# Patient Record
Sex: FEMALE | Race: WHITE | Employment: UNEMPLOYED | URBAN - NONMETROPOLITAN AREA
[De-identification: names, ages, dates, MRNs, and addresses within clinical notes are randomized per-mention and may not be internally consistent; named-entity substitution may affect disease eponyms.]

---

## 2017-01-27 ENCOUNTER — OFFICE VISIT - GICH (OUTPATIENT)
Dept: FAMILY MEDICINE | Facility: OTHER | Age: 22
End: 2017-01-27

## 2017-01-27 DIAGNOSIS — Z30.9 ENCOUNTER FOR CONTRACEPTIVE MANAGEMENT: ICD-10-CM

## 2017-01-27 DIAGNOSIS — H66.002 ACUTE SUPPURATIVE OTITIS MEDIA OF LEFT EAR WITHOUT SPONTANEOUS RUPTURE OF TYMPANIC MEMBRANE: ICD-10-CM

## 2017-01-27 ASSESSMENT — ANXIETY QUESTIONNAIRES
6. BECOMING EASILY ANNOYED OR IRRITABLE: NOT AT ALL
GAD7 TOTAL SCORE: 0
7. FEELING AFRAID AS IF SOMETHING AWFUL MIGHT HAPPEN: NOT AT ALL
4. TROUBLE RELAXING: NOT AT ALL
5. BEING SO RESTLESS THAT IT IS HARD TO SIT STILL: NOT AT ALL
1. FEELING NERVOUS, ANXIOUS, OR ON EDGE: NOT AT ALL
2. NOT BEING ABLE TO STOP OR CONTROL WORRYING: NOT AT ALL
3. WORRYING TOO MUCH ABOUT DIFFERENT THINGS: NOT AT ALL

## 2017-02-01 ENCOUNTER — COMMUNICATION - GICH (OUTPATIENT)
Dept: FAMILY MEDICINE | Facility: OTHER | Age: 22
End: 2017-02-01

## 2017-02-01 ENCOUNTER — AMBULATORY - GICH (OUTPATIENT)
Dept: LAB | Facility: OTHER | Age: 22
End: 2017-02-01

## 2017-02-01 DIAGNOSIS — Z30.9 ENCOUNTER FOR CONTRACEPTIVE MANAGEMENT: ICD-10-CM

## 2017-02-01 LAB — HCG UR QL: NEGATIVE

## 2017-12-28 ENCOUNTER — COMMUNICATION - GICH (OUTPATIENT)
Dept: FAMILY MEDICINE | Facility: OTHER | Age: 22
End: 2017-12-28

## 2017-12-28 DIAGNOSIS — F41.1 GENERALIZED ANXIETY DISORDER: ICD-10-CM

## 2018-01-03 NOTE — TELEPHONE ENCOUNTER
Patient Information     Patient Name MRN Anita Michele [id number] Female 1995      Telephone Encounter by Yogesh Lerma at 2017  3:09 PM     Author:  Yogesh Lerma Service:  (none) Author Type:  (none)     Filed:  2017  3:10 PM Encounter Date:  2017 Status:  Signed     :  Yogesh Lerma            Patient notified.   Yogesh Lerma LPN........2017  3:10 PM

## 2018-01-03 NOTE — TELEPHONE ENCOUNTER
Patient Information     Patient Name MRN Anita Michele [id number] Female 1995      Telephone Encounter by Michell Brown at 2017  2:10 PM     Author:  Michell Brown Service:  (none) Author Type:  (none)     Filed:  2017  2:11 PM Encounter Date:  2017 Status:  Signed     :  Michell Brown            Patient is requesting a phone call back in regards to the lab appointment done today. Please advise

## 2018-01-03 NOTE — PATIENT INSTRUCTIONS
Patient Information     Patient Name MRN Anita Michele [id number] Female 1995      Patient Instructions by Micheline Vila PA-C at 2017 12:45 PM     Author:  Micheline Vila PA-C Service:  (none) Author Type:  PHYS- Physician Assistant     Filed:  2017  1:19 PM Encounter Date:  2017 Status:  Signed     :  Micheline Vila PA-C (PHYS- Physician Assistant)            Return on days 1-5 of your next period for a lab only appointment to give a urine pregnancy test. If it is negative, then we will refill your birth control.   Return to clinic with change/worsening of symptoms.     Ear infection - Antibiotic has been sent to pharmacy. Please take full course of antibiotic even if symptoms have completely resolved. This helps prevent against antibiotic resistance.     Please take tylenol or ibuprofen as needed for ear pain.  Monitor for any fevers or chills. Return in 7-10 days if not feeling better. Please call clinic with any questions or concerns. Please take in a lot of fluids and get rest. Discouraged swimming while patient has the infection.  Encouraged to use warm compresses with a warm washcloth or a heating pad on the lowest setting for 10-15 minutes at a time several times daily for comfort.     May use cough syrup or cough drops.  Using a humidifier works well to break up the congestion. You can also sleep propped up on a couple pillows to decrease symptoms at night.     You will need to be evaluated if you start to experience:  Fever higher than 102.5 F (39.2 C)   Sudden and severe pain in the face and head   Trouble seeing or seeing double   Trouble thinking clearly   Swelling or redness around 1 or both eyes   Trouble breathing or a stiff neck    Call  or go to the emergency room if you:  Have trouble breathing   Are drooling because you cannot swallow your saliva   Have swelling of the neck or tongue   Cannot move your neck or have trouble opening your mouth

## 2018-01-03 NOTE — TELEPHONE ENCOUNTER
Patient Information     Patient Name MRN Anita Michele [id number] Female 1995      Telephone Encounter by Micheline Vila PA-C at 2017  2:13 PM     Author:  Micheline Vila PA-C Service:  (none) Author Type:  PHYS- Physician Assistant     Filed:  2017  2:14 PM Encounter Date:  2017 Status:  Signed     :  Micheline Vila PA-C (PHYS- Physician Assistant)            Negative UPT.   Sent OCP to the pharmacy.   Micheline Vila PA-C ....................  2017   2:14 PM

## 2018-01-03 NOTE — PROGRESS NOTES
Patient Information     Patient Name MRN Sex Anita Perez [id number] Female 1995      Progress Notes by Micheline Vila PA-C at 2017 12:45 PM     Author:  Micheline Vila PA-C Service:  (none) Author Type:  PHYS- Physician Assistant     Filed:  2017  1:21 PM Encounter Date:  2017 Status:  Signed     :  Micheline Vila PA-C (PHYS- Physician Assistant)            There are no exam notes on file for this visit.    HPI:    Anita Omalley is a 22 y.o. female who presents for ear ache. Sore throat and sinus pain. ST x 3 days.  Goes up and down.  Started to get a stuffy nose yesterday.  Ears plugged yesterday, worse on left side. Tx heating pad on ears. Ears stuffy.  No fevers, chills.  No GI symptoms.  Coughing.  No wheezing, rattling.     Need birth control refilled. Last took in November.  No pregnancy concerns or STD concerns.  LMP about a month ago.  Periods irregular. Cramping.       Past Medical History      Diagnosis   Date     Anxiety disorder       Chronic rhinitis       Conjunctivitis  2004     Recurrent      Dysmenorrhea       Menorrhagia         Past Surgical History       Procedure   Laterality Date     Adenoidectomy   2012     Also tuminoplasty scheduled         Social History     Substance Use Topics       Smoking status: Passive Smoke Exposure - Never Smoker     Smokeless tobacco: Never Used     Alcohol use No       Current Outpatient Prescriptions       Medication  Sig Dispense Refill     norgestrel-ethinyl estradiol, 0.3-30 mg-mcg, (CRYSELLE) 0.3-30 mg-mcg tablet Take 1 tablet by mouth once daily. 84 tablet 3     SUMAtriptan (IMITREX) 50 mg tablet TAKE 1 TABLET BY MOUTH EVERY 2 HOURS IF NEEDED FOR MIGRAINE. MAX DOSE: 200MG PER 24 HRS. 9 tablet 1     No current facility-administered medications for this visit.      Medications have been reviewed by me and are current to the best of my knowledge and ability.      Allergies      Allergen   Reactions     Birch  Other  - Describe In Comment Field     sneezing        REVIEW OF SYSTEMS:  Refer to HPI.    EXAM:   Vitals:    /78  Pulse 99  Wt 64.9 kg (143 lb)    General Appearance: Pleasant, alert, appropriate appearance for age. No acute distress  Ear Exam: Erythematous left TM, translucent, bony landmarks appreciated.   Left TM: Effusion is present. TM is bulging. There is no pus appreciated.    Right TM: Effusion is not present. TM is not bulging. There is no pus appreciated.    Normal auditory canals and external ears. Non-tender.   OroPharynx Exam:  Erythematous posterior pharynx with no exudates. No sinus pain upon palpation of frontal, ethmoid, and maxillary sinuses.  Chest/Respiratory Exam: Normal chest wall and respirations. Clear to auscultation. No retractions appreciated.  Cardiovascular Exam: Regular rate and rhythm. S1, S2, no murmur, click, gallop, or rubs.  Lymphatic Exam: ACLN.  Skin: no rash or abnormalities  Psychiatric Exam: Alert and oriented - appropriate affect.      LABS:    No results found for this visit on 01/27/17.    ASSESSMENT AND PLAN:      ICD-10-CM    1. Acute suppurative otitis media of left ear without spontaneous rupture of tympanic membrane, recurrence not specified H66.002 amoxicillin (AMOXIL) 875 mg tablet   2. Birth control counseling Z30.9 PREGNANCY URINE       Started on amoxicillin for a LOM.     Return on days 1-5 of your next period for a lab only appointment to give a urine pregnancy test. If it is negative, then we will refill your birth control.   Return to clinic with change/worsening of symptoms.   Declined STD testing at this time.     Patient Instructions   Return on days 1-5 of your next period for a lab only appointment to give a urine pregnancy test. If it is negative, then we will refill your birth control.   Return to clinic with change/worsening of symptoms.     Ear infection - Antibiotic has been sent to pharmacy. Please take full course of antibiotic even if symptoms  have completely resolved. This helps prevent against antibiotic resistance.     Please take tylenol or ibuprofen as needed for ear pain.  Monitor for any fevers or chills. Return in 7-10 days if not feeling better. Please call clinic with any questions or concerns. Please take in a lot of fluids and get rest. Discouraged swimming while patient has the infection.  Encouraged to use warm compresses with a warm washcloth or a heating pad on the lowest setting for 10-15 minutes at a time several times daily for comfort.     May use cough syrup or cough drops.  Using a humidifier works well to break up the congestion. You can also sleep propped up on a couple pillows to decrease symptoms at night.     You will need to be evaluated if you start to experience:  Fever higher than 102.5 F (39.2 C)   Sudden and severe pain in the face and head   Trouble seeing or seeing double   Trouble thinking clearly   Swelling or redness around 1 or both eyes   Trouble breathing or a stiff neck    Call 9-1-1 or go to the emergency room if you:  Have trouble breathing   Are drooling because you cannot swallow your saliva   Have swelling of the neck or tongue   Cannot move your neck or have trouble opening your mouth        Micheline Vila PA-C..................1/27/2017 1:09 PM

## 2018-01-27 VITALS
SYSTOLIC BLOOD PRESSURE: 118 MMHG | BODY MASS INDEX: 22.07 KG/M2 | WEIGHT: 143 LBS | HEART RATE: 99 BPM | DIASTOLIC BLOOD PRESSURE: 78 MMHG

## 2018-01-30 ASSESSMENT — ANXIETY QUESTIONNAIRES: GAD7 TOTAL SCORE: 0

## 2018-02-07 ENCOUNTER — DOCUMENTATION ONLY (OUTPATIENT)
Dept: FAMILY MEDICINE | Facility: OTHER | Age: 23
End: 2018-02-07

## 2018-02-07 RX ORDER — SUMATRIPTAN 50 MG/1
1 TABLET, FILM COATED ORAL
COMMUNITY
Start: 2016-08-02

## 2018-02-07 RX ORDER — LEVONORGESTREL/ETHIN.ESTRADIOL 0.1-0.02MG
1 TABLET ORAL DAILY
COMMUNITY
Start: 2017-02-01 | End: 2018-08-10

## 2018-02-12 NOTE — TELEPHONE ENCOUNTER
Patient Information     Patient Name MRN Anita Michele [id number] Female 1995      Telephone Encounter by Julisa Spencer RN at 2018  9:40 AM     Author:  Julisa Spencer RN Service:  (none) Author Type:  NURS- Registered Nurse     Filed:  2018  9:43 AM Encounter Date:  2017 Status:  Signed     :  Julisa Spencer RN (NURS- Registered Nurse)            Refill request inappropriate. Medication was discontinued on 2016. Patient states no longer taking. Pharmacy alerted. Unable to complete prescription refill per RN Medication Refill Policy.................... Julisa Spencer RN ....................  2018   9:43 AM

## 2018-03-25 ENCOUNTER — HEALTH MAINTENANCE LETTER (OUTPATIENT)
Age: 23
End: 2018-03-25

## 2018-08-10 ENCOUNTER — RESULT FOLLOW UP (OUTPATIENT)
Dept: OBGYN | Facility: CLINIC | Age: 23
End: 2018-08-10

## 2018-08-10 ENCOUNTER — OFFICE VISIT (OUTPATIENT)
Dept: OBGYN | Facility: CLINIC | Age: 23
End: 2018-08-10
Payer: COMMERCIAL

## 2018-08-10 ENCOUNTER — EXTERNAL ORDER RESULTS (OUTPATIENT)
Dept: OBGYN | Facility: CLINIC | Age: 23
End: 2018-08-10

## 2018-08-10 VITALS
HEIGHT: 66 IN | SYSTOLIC BLOOD PRESSURE: 110 MMHG | BODY MASS INDEX: 23.14 KG/M2 | WEIGHT: 144 LBS | DIASTOLIC BLOOD PRESSURE: 62 MMHG

## 2018-08-10 DIAGNOSIS — Z86.2 HISTORY OF ANEMIA: ICD-10-CM

## 2018-08-10 DIAGNOSIS — Z01.419 WELL WOMAN EXAM WITH ROUTINE GYNECOLOGICAL EXAM: Primary | ICD-10-CM

## 2018-08-10 DIAGNOSIS — B96.89 BACTERIAL VAGINOSIS: ICD-10-CM

## 2018-08-10 DIAGNOSIS — N76.0 BACTERIAL VAGINOSIS: ICD-10-CM

## 2018-08-10 DIAGNOSIS — Z30.011 ENCOUNTER FOR INITIAL PRESCRIPTION OF CONTRACEPTIVE PILLS: ICD-10-CM

## 2018-08-10 DIAGNOSIS — N89.8 VAGINAL DISCHARGE: ICD-10-CM

## 2018-08-10 LAB
BASOPHILS # BLD AUTO: 0 10E9/L (ref 0–0.2)
BASOPHILS NFR BLD AUTO: 0.5 %
DIFFERENTIAL METHOD BLD: NORMAL
EOSINOPHIL # BLD AUTO: 0.1 10E9/L (ref 0–0.7)
EOSINOPHIL NFR BLD AUTO: 1.6 %
ERYTHROCYTE [DISTWIDTH] IN BLOOD BY AUTOMATED COUNT: 12.9 % (ref 10–15)
HCT VFR BLD AUTO: 40.9 % (ref 35–47)
HGB BLD-MCNC: 13 G/DL (ref 11.7–15.7)
LYMPHOCYTES # BLD AUTO: 1.6 10E9/L (ref 0.8–5.3)
LYMPHOCYTES NFR BLD AUTO: 27.3 %
MCH RBC QN AUTO: 31 PG (ref 26.5–33)
MCHC RBC AUTO-ENTMCNC: 31.8 G/DL (ref 31.5–36.5)
MCV RBC AUTO: 97 FL (ref 78–100)
MONOCYTES # BLD AUTO: 0.5 10E9/L (ref 0–1.3)
MONOCYTES NFR BLD AUTO: 8 %
NEUTROPHILS # BLD AUTO: 3.6 10E9/L (ref 1.6–8.3)
NEUTROPHILS NFR BLD AUTO: 62.6 %
PLATELET # BLD AUTO: 238 10E9/L (ref 150–450)
RBC # BLD AUTO: 4.2 10E12/L (ref 3.8–5.2)
SPECIMEN SOURCE: ABNORMAL
WBC # BLD AUTO: 5.8 10E9/L (ref 4–11)
WET PREP SPEC: ABNORMAL

## 2018-08-10 PROCEDURE — 99385 PREV VISIT NEW AGE 18-39: CPT | Performed by: ADVANCED PRACTICE MIDWIFE

## 2018-08-10 PROCEDURE — 99213 OFFICE O/P EST LOW 20 MIN: CPT | Mod: 25 | Performed by: ADVANCED PRACTICE MIDWIFE

## 2018-08-10 PROCEDURE — 87491 CHLMYD TRACH DNA AMP PROBE: CPT | Performed by: ADVANCED PRACTICE MIDWIFE

## 2018-08-10 PROCEDURE — G0124 SCREEN C/V THIN LAYER BY MD: HCPCS | Performed by: ADVANCED PRACTICE MIDWIFE

## 2018-08-10 PROCEDURE — 85025 COMPLETE CBC W/AUTO DIFF WBC: CPT | Performed by: ADVANCED PRACTICE MIDWIFE

## 2018-08-10 PROCEDURE — 87210 SMEAR WET MOUNT SALINE/INK: CPT | Performed by: ADVANCED PRACTICE MIDWIFE

## 2018-08-10 PROCEDURE — G0145 SCR C/V CYTO,THINLAYER,RESCR: HCPCS | Performed by: ADVANCED PRACTICE MIDWIFE

## 2018-08-10 PROCEDURE — 87591 N.GONORRHOEAE DNA AMP PROB: CPT | Performed by: ADVANCED PRACTICE MIDWIFE

## 2018-08-10 PROCEDURE — 36415 COLL VENOUS BLD VENIPUNCTURE: CPT | Performed by: ADVANCED PRACTICE MIDWIFE

## 2018-08-10 RX ORDER — ACETAMINOPHEN AND CODEINE PHOSPHATE 120; 12 MG/5ML; MG/5ML
1 SOLUTION ORAL DAILY
Qty: 28 TABLET | Refills: 12 | Status: SHIPPED | OUTPATIENT
Start: 2018-08-10 | End: 2019-08-21

## 2018-08-10 RX ORDER — METRONIDAZOLE 7.5 MG/G
1 GEL VAGINAL AT BEDTIME
Qty: 70 G | Refills: 0 | Status: SHIPPED | OUTPATIENT
Start: 2018-08-10 | End: 2018-08-15

## 2018-08-10 NOTE — PATIENT INSTRUCTIONS
Birth Control Pills    Combination birth control pills contain both estrogen and progestin.  There are numerous brands of birth control pills otherwise known as oral contraceptive pills (OCP's).  Each brand has a different combination of estrogen and progestin so every woman can find the one that is right for her.  OCP's are a safe and effective way to prevent pregnancy in most women.    How do OCP's work  OCP's work by several different mechanisms.  They cause changes in the cervix and the lining of the uterus.  The cervical mucus becomes thicker which will prevent the sperm from entering the cervix.  The lining of the uterus becomes thin which helps prevent an egg from attaching to it.  In combination, these events make it unlikely that you will get pregnant. It may also prevent ovulation completely.    Benefits of OCP's  May reduce your risk of:  Cancer of the uterus and ovary, ovarian cysts, pelvic infection, bone loss, benign breast disease, anemia, ectopic pregnancy and acne.  It may also decrease symptoms of PCOS (Polycystic Ovarian Syndrome). OCP's may also improve cramping during menstrual cycle and may make you cycle shorter and lighter.    How to take OCP's  You have several choices on how to start taking your OCP's:    You can start the pill on the first day of your next period    You can start the pill on the Sunday after your next period starts    You can start the pill on the first day it was prescribed no matter where you are in your cycle.  In this case, you will need to make sure you are not pregnant.    No matter when you start your first pack, you will always start your next pack on the same day you started your first pack.    You should take the pill at the same time every day.  Do not skip any pills.  If you miss any pills, are taking antibiotics or vomit, use a backup method of birth control until you get your next period.    Pills come in packs of 21, 28 or 91 pills:      21 Pills:  Take one  pill at the same time every day for 21 days.  Wait 7 days before beginning your next pack.  During these 7 days you will have your period.    28 Pills:  Take one pill at the same time every day for 28 days.  The last 7 pills in the pack do not contain estrogen/progestin.  During these 7 days you will have your period.      91 Pills:  Take one pill at the same time every day for 91 days.  The last 7 pills in the pack do not contain estrogen/progestin.  During these 7 days you will have your period.  With this method you will only have 4 periods a year.  Some women eventually have no bleeding at all.    Each pill pack comes with instructions.  Please make sure you read them and understand these instructions.      What to do if you miss a pill    Occasionally you may forget to take a pill or not take it on time.  Take the missed pill as soon as you remember.  Take the next pill at the regular time.  It is ok if you take two pills in one day.  You may feel a bit queasy or have some spotting, this is normal and should not be concerning.  If you have missed more than one pill use a back up method of birth control and call the clinic for instructions on how to proceed.    Who should not take Combined OCP's    If you have a history or have blood clots    A history of cerebral vascular accident (stroke)    If you have ischemic heart or coronary artery disease    Known of suspected breast cancer    Known or suspected pregnancy    Smoker and over age 35    Any know liver abnormality    Migraine headaches with an aura    Undiagnosed abnormal vaginal bleeding    High blood pressure    Common side effects when starting OCP's  Headache, nausea, dizziness, breakthrough bleeding, missed periods, tender breasts, depression and anxiety.  Most side effects are minor and resolve in the first few months. Take the pill with meals or at bedtime if nausea occurs.    Call or return for care in the following circumstances:      Unexpected  missed periods or very heavy bleeding    Persistent vaginal bleeding    Depression    Suspected pregnancy    Persistent side effects such as:  Nausea, irregular menses or mood changes.    Seek emergency care immediately for the following:  ACHES    Abdominal or pelvic pain    Chest pain    Severe headache     Visual disturbances    Severe leg pain or numbness or tingling of extremities    Lastly-    Use of a backup method is recommended for the first cycle    Condoms are recommended to protect against STI's    OCP's are 99% effective if take correctly.    The pill helps to keep your periods regular, lighter and shorter and reduces cramps.  If you desire a pregnancy, you may stop taking your OCPs.     Please call the clinic with questions and concerns  [facility]  [phone]

## 2018-08-10 NOTE — LETTER
July 25, 2019      Anita Omalley  [address] [facility] [address]    Dear MsCarl,      At [facility], your health and wellness is our primary concern. That is why we are following up on an abnormal pap from 08/10/18, which was reported as ASCUS. Your provider had recommended that you have a Pap smear completed by 08/10/19. Our records do not show that this has been scheduled.    It is important to complete the follow up that your provider has suggested for you to ensure that there are no worsening changes which may, over time, develop into cancer.      Please contact our office at  [phone] to schedule an appointment for a Pap smear at your earliest convenience. If you have questions or concerns, please call the clinic and we will be happy to assist you.    If you have completed the tests outside of [facility], please have the results forwarded to our office. We will update the chart for your primary Physician to review before your next annual physical.     Thank you for choosing [facility]!    Sincerely,      Your [facility] Care Team/[facility]

## 2018-08-10 NOTE — MR AVS SNAPSHOT
After Visit Summary   8/10/2018    Anita Omalley    MRN: [id number]           Patient Information     Date Of Birth          1995        Visit Information        Provider Department      8/10/2018 11:00 AM Mercy Wolf CNM [facility]        Today's Diagnoses     Well woman exam with routine gynecological exam    -  1    History of anemia        Encounter for initial prescription of contraceptive pills          Care Instructions    Birth Control Pills    Combination birth control pills contain both estrogen and progestin.  There are numerous brands of birth control pills otherwise known as oral contraceptive pills (OCP's).  Each brand has a different combination of estrogen and progestin so every woman can find the one that is right for her.  OCP's are a safe and effective way to prevent pregnancy in most women.    How do OCP's work  OCP's work by several different mechanisms.  They cause changes in the cervix and the lining of the uterus.  The cervical mucus becomes thicker which will prevent the sperm from entering the cervix.  The lining of the uterus becomes thin which helps prevent an egg from attaching to it.  In combination, these events make it unlikely that you will get pregnant. It may also prevent ovulation completely.    Benefits of OCP's  May reduce your risk of:  Cancer of the uterus and ovary, ovarian cysts, pelvic infection, bone loss, benign breast disease, anemia, ectopic pregnancy and acne.  It may also decrease symptoms of PCOS (Polycystic Ovarian Syndrome). OCP's may also improve cramping during menstrual cycle and may make you cycle shorter and lighter.    How to take OCP's  You have several choices on how to start taking your OCP's:    You can start the pill on the first day of your next period    You can start the pill on the Sunday after your next period starts    You can start the pill on the first day it was prescribed no matter where you  are in your cycle.  In this case, you will need to make sure you are not pregnant.    No matter when you start your first pack, you will always start your next pack on the same day you started your first pack.    You should take the pill at the same time every day.  Do not skip any pills.  If you miss any pills, are taking antibiotics or vomit, use a backup method of birth control until you get your next period.    Pills come in packs of 21, 28 or 91 pills:      21 Pills:  Take one pill at the same time every day for 21 days.  Wait 7 days before beginning your next pack.  During these 7 days you will have your period.    28 Pills:  Take one pill at the same time every day for 28 days.  The last 7 pills in the pack do not contain estrogen/progestin.  During these 7 days you will have your period.      91 Pills:  Take one pill at the same time every day for 91 days.  The last 7 pills in the pack do not contain estrogen/progestin.  During these 7 days you will have your period.  With this method you will only have 4 periods a year.  Some women eventually have no bleeding at all.    Each pill pack comes with instructions.  Please make sure you read them and understand these instructions.      What to do if you miss a pill    Occasionally you may forget to take a pill or not take it on time.  Take the missed pill as soon as you remember.  Take the next pill at the regular time.  It is ok if you take two pills in one day.  You may feel a bit queasy or have some spotting, this is normal and should not be concerning.  If you have missed more than one pill use a back up method of birth control and call the clinic for instructions on how to proceed.    Who should not take Combined OCP's    If you have a history or have blood clots    A history of cerebral vascular accident (stroke)    If you have ischemic heart or coronary artery disease    Known of suspected breast cancer    Known or suspected pregnancy    Smoker and over  age 35    Any know liver abnormality    Migraine headaches with an aura    Undiagnosed abnormal vaginal bleeding    High blood pressure    Common side effects when starting OCP's  Headache, nausea, dizziness, breakthrough bleeding, missed periods, tender breasts, depression and anxiety.  Most side effects are minor and resolve in the first few months. Take the pill with meals or at bedtime if nausea occurs.    Call or return for care in the following circumstances:      Unexpected missed periods or very heavy bleeding    Persistent vaginal bleeding    Depression    Suspected pregnancy    Persistent side effects such as:  Nausea, irregular menses or mood changes.    Seek emergency care immediately for the following:  ACHES    Abdominal or pelvic pain    Chest pain    Severe headache     Visual disturbances    Severe leg pain or numbness or tingling of extremities    Lastly-    Use of a backup method is recommended for the first cycle    Condoms are recommended to protect against STI's    OCP's are 99% effective if take correctly.    The pill helps to keep your periods regular, lighter and shorter and reduces cramps.  If you desire a pregnancy, you may stop taking your OCPs.     Please call the clinic with questions and concerns  [facility]  [phone]            Follow-ups after your visit        Follow-up notes from your care team     Return in about 1 year (around 8/10/2019) for Physical Exam.      Who to contact     If you have questions or need follow up information about today's clinic visit or your schedule please contact [facility] directly at [phone].  Normal or non-critical lab and imaging results will be communicated to you by MyChart, letter or phone within 4 business days after the clinic has received the results. If you do not hear from us within 7 days, please contact the clinic through MyChart or phone. If you have a critical or abnormal lab result, we will notify you by  "phone as soon as possible.  Submit refill requests through [employer] or call your pharmacy and they will forward the refill request to us. Please allow 3 business days for your refill to be completed.          Additional Information About Your Visit        [employer] Information     [employer] gives you secure access to your electronic health record. If you see a primary care provider, you can also send messages to your care team and make appointments. If you have questions, please call your primary care clinic.  If you do not have a primary care provider, please call [phone] and they will assist you.        Care EveryWhere ID     This is your Care EveryWhere ID. This could be used by other organizations to access your [facility] medical records  [id number]        Your Vitals Were     Height Last Period BMI (Body Mass Index)             5' 6\" (1.676 m) 07/24/2018 (Within Days) 23.24 kg/m2          Blood Pressure from Last 3 Encounters:   08/10/18 110/62   01/27/17 118/78   05/02/16 100/64    Weight from Last 3 Encounters:   08/10/18 144 lb (65.3 kg)   01/27/17 143 lb (64.9 kg)   05/02/16 137 lb (62.1 kg) (67 %)*     * Growth percentiles are based on CDC 2-20 Years data.              We Performed the Following     CBC with platelets and differential          Today's Medication Changes          These changes are accurate as of 8/10/18 11:40 AM.  If you have any questions, ask your nurse or doctor.               Start taking these medicines.        Dose/Directions    norethindrone 0.35 MG per tablet   Commonly known as:  MICRONOR   Used for:  Encounter for initial prescription of contraceptive pills   Started by:  Mercy Wolf CNM        Dose:  1 tablet   Take 1 tablet (0.35 mg) by mouth daily   Quantity:  28 tablet   Refills:  12            Where to get your medicines      These medications were sent to [facility] [address] [place], [place] - [address] AT [facility] [address]  [address], [facility] [place] [address]     Phone:  [phone]     norethindrone 0.35 MG per tablet                Primary Care Provider Office Phone # Fax #    Charlee DIAZ Ayden Kimble [phone]       [address]  [place] [address]        Equal Access to Services     [facility]: Hadii aad ku hadasho Soomaali, waaxda luqadaha, qaybta kaalmada adeegyada, waxay idiin hayaan adeeg kharash lagiulianon . So [facility] [phone].    ATENCIÓN: Si habla español, tiene a oglesby disposición servicios gratuitos de asistencia lingüística. [facility] [phone].    We comply with applicable federal civil rights laws and [place] laws. We do not discriminate on the basis of race, color, national origin, age, disability, sex, sexual orientation, or gender identity.            Thank you!     Thank you for choosing [facility]  for your care. Our goal is always to provide you with excellent care. Hearing back from our patients is one way we can continue to improve our services. Please take a few minutes to complete the written survey that you may receive in the mail after your visit with us. Thank you!             Your Updated Medication List - Protect others around you: Learn how to safely use, store and throw away your medicines at www.disposemymeds.org.          This list is accurate as of 8/10/18 11:40 AM.  Always use your most recent med list.                   Brand Name Dispense Instructions for use Diagnosis    norethindrone 0.35 MG per tablet    MICRONOR    28 tablet    Take 1 tablet (0.35 mg) by mouth daily    Encounter for initial prescription of contraceptive pills       SUMAtriptan 50 MG tablet    IMITREX     Take 1 tablet by mouth every 2 hours as needed for migraine . Max dose: 200mg per 24 hours

## 2018-08-10 NOTE — NURSING NOTE
"Chief Complaint   Patient presents with     Gyn Exam     Annual- no concerns      Contraception     OCP       Initial /62 (BP Location: Left arm, Patient Position: Sitting, Cuff Size: Adult Regular)  Ht 5' 6\" (1.676 m)  Wt 144 lb (65.3 kg)  LMP 07/24/2018 (Within Days)  BMI 23.24 kg/m2 Estimated body mass index is 23.24 kg/(m^2) as calculated from the following:    Height as of this encounter: 5' 6\" (1.676 m).    Weight as of this encounter: 144 lb (65.3 kg).  BP completed using cuff size: regular    No obstetric history on file.    The following HM Due: pap smear      The following patient reported/Care Every where data was sent to:  P ABSTRACT QUALITY INITIATIVES [[id number]]      patient has appointment for today.  Elmer Medrano CMA                 "

## 2018-08-10 NOTE — PROGRESS NOTES
"Anita is a 23 year old No obstetric history on file. female who presents for annual exam.     Besides routine health maintenance,  she would like to discuss contraception. Patient also reports a history of anemia due to heavy periods.   HPI:  Patient reports she has had heavy periods for many years. Denies irregular bleeding between periods. Has occasionally taken iron supplements when she \"feels low.\" She has not had iron checked for 3 years. Did try Depo to control bleeding but it caused spotting and she stopped using it.   Menses are regular q 28-30 days and normal lasting 7 days.   Menses flow: heavy.    Patient's last menstrual period was 07/24/2018 (within days)..   Using condoms for contraception.    She is not currently considering pregnancy.    REPRODUCTIVE/GYNECOLOGIC HISTORY:  Anita is sexually active with male partner(s) and is currently in monogamous relationship.   STI testing offered?  Accepted  History of abnormal Pap smear:  No  SOCIAL HISTORY  Abuse: does not report having previously been physical or sexually abused.    Do you feel safe in your environment? YES    She  reports that she is a non-smoker but has been exposed to tobacco smoke. She has never used smokeless tobacco.      Last PHQ-9 score on record = No flowsheet data found.  Last GAD7 score on record =   ABY-7 SCORE 1/27/2017   Total Score 0         HEALTH MAINTENANCE:  Cholesterol: (No results found for: CHOL History of abnormal lipids: No  Mammo: N/A . History of abnormal Mammo: Not applicable.  Regular Self Breast Exams: Yes  TSH: (  TSH   Date Value Ref Range Status   09/29/2015 0.71 0.40 - 4.00 mU/L Final    )  Pap; (No results found for: PAP )  Immunizations up to date: yes  (Gardasil, Tdap, Flu)  Health maintenance updated:  no    HEALTHY HABITS  Eating habits: eats regular meals  Calcium/Vitamin D intake: source:  dairy Adequate?   Exercise: How often do you exercise? Irregular;Cardio  Have you had an eye exam in the last two " "years? YES  Do you routinely see the dentist once or twice yearly? YES      HISTORY:  Obstetric History     No data available        Past Medical History:   Diagnosis Date     Anxiety disorder     No Comments Provided     Chronic rhinitis     No Comments Provided     Conjunctivitis     2004,Recurrent     Dysmenorrhea     No Comments Provided     Excessive and frequent menstruation with regular cycle     No Comments Provided     Past Surgical History:   Procedure Laterality Date     ADENOIDECTOMY      2012,Also tuminoplasty scheduled     History reviewed. No pertinent family history.  Social History     Social History     Marital status: Single     Spouse name: SO:  Michael Cordova     Number of children: N/A     Years of education: 12+     Social History Main Topics     Smoking status: Passive Smoke Exposure - Never Smoker     Smokeless tobacco: Never Used     Alcohol use No     Drug use: No      Comment: Drug use: No     Sexual activity: Yes     Partners: Male     Birth control/ protection: Condom     Other Topics Concern     None     Social History Narrative    Lives with mother and stepfather and four siblings.  Graduated [employer].  Regular contact with father.       Current Outpatient Prescriptions:      metroNIDAZOLE (METROGEL) 0.75 % vaginal gel, Place 1 applicator (5 g) vaginally At Bedtime for 5 days, Disp: 70 g, Rfl: 0     norethindrone (MICRONOR) 0.35 MG per tablet, Take 1 tablet (0.35 mg) by mouth daily, Disp: 28 tablet, Rfl: 12     SUMAtriptan (IMITREX) 50 MG tablet, Take 1 tablet by mouth every 2 hours as needed for migraine . Max dose: 200mg per 24 hours, Disp: , Rfl:    No Known Allergies    Past medical, surgical, social and family history were reviewed and updated in EPIC.    ROS:   12 point review of systems negative other than symptoms noted below.    PHYSICAL EXAM:  /62 (BP Location: Left arm, Patient Position: Sitting, Cuff Size: Adult Regular)  Ht 5' 6\" (1.676 m)  Wt 144 lb " (65.3 kg)  LMP 07/24/2018 (Within Days)  BMI 23.24 kg/m2   BMI: Body mass index is 23.24 kg/(m^2).  Constitutional: healthy, alert and no distress  Neck: symmetrical, thyroid normal size, no masses present, no lymphadenopathy present.   Cardiovascular: RRR, no murmurs present  Respiratory: breathing unlabored, lungs CTA bilaterally  Breast:normal without masses, tenderness or nipple discharge and no palpable axillary masses or adenopathy  Gastrointestinal: abdomen soft, non-tender, bowel sounds present  PELVIC EXAM:  Vulva: No lesions, no adenopathy, BUS WNL  Vagina: Moist, pink, discharge consistent with BV and malodorous  well rugated, no lesions  Cervix:smooth, pink, no visible lesions  Uterus: Normal size, non-tender, mobile  Ovaries: No masses palpated  Rectal exam: deferred    ASSESSMENT/PLAN:    ICD-10-CM    1. Well woman exam with routine gynecological exam Z01.419 NEISSERIA GONORRHOEA PCR     CHLAMYDIA TRACHOMATIS PCR     Pap imaged thin layer screen only - recommended age 21 - 24 years   2. History of anemia Z86.2 CBC with platelets and differential   3. Encounter for initial prescription of contraceptive pills Z30.011 norethindrone (MICRONOR) 0.35 MG per tablet   4. Vaginal discharge N89.8 Wet prep     metroNIDAZOLE (METROGEL) 0.75 % vaginal gel   5. Bacterial vaginosis N76.0 metroNIDAZOLE (METROGEL) 0.75 % vaginal gel    B96.89      Results for orders placed or performed in visit on 08/10/18   CBC with platelets and differential   Result Value Ref Range    WBC 5.8 4.0 - 11.0 10e9/L    RBC Count 4.20 3.8 - 5.2 10e12/L    Hemoglobin 13.0 11.7 - 15.7 g/dL    Hematocrit 40.9 35.0 - 47.0 %    MCV 97 78 - 100 fl    MCH 31.0 26.5 - 33.0 pg    MCHC 31.8 31.5 - 36.5 g/dL    RDW 12.9 10.0 - 15.0 %    Platelet Count 238 150 - 450 10e9/L    Diff Method Automated Method     % Neutrophils 62.6 %    % Lymphocytes 27.3 %    % Monocytes 8.0 %    % Eosinophils 1.6 %    % Basophils 0.5 %    Absolute Neutrophil 3.6 1.6 -  8.3 10e9/L    Absolute Lymphocytes 1.6 0.8 - 5.3 10e9/L    Absolute Monocytes 0.5 0.0 - 1.3 10e9/L    Absolute Eosinophils 0.1 0.0 - 0.7 10e9/L    Absolute Basophils 0.0 0.0 - 0.2 10e9/L   Wet prep   Result Value Ref Range    Specimen Description Vagina     Wet Prep No Trichomonas seen     Wet Prep Clue cells seen (A)     Wet Prep No yeast seen      Discussed all forms of birth control available to the patient. Due to history of migraines, recommended to avoid estrogen forms if possible. Discussed POPs, Nexplanon, hormonal and copper IUDs. Patient would like to start POP. No contraindications to use. Risks and benefits discussed.     Handout provided. Instructed to begin on first day of next period. Use  Backup method x 1 month.     Wet prep positive for bacterial vaginosis. Metrogel ordered    CBC normal. Iron supplement not indicated at this time.       COUNSELING:   Reviewed preventive health counseling, as reflected in patient instructions  Special attention given to:        Regular exercise       Contraception       Safe sex practices/STD prevention   reports that she is a non-smoker but has been exposed to tobacco smoke. She has never used smokeless tobacco.      Mercy Wolf CNM, [id number]

## 2018-08-12 LAB
C TRACH DNA SPEC QL NAA+PROBE: NEGATIVE
N GONORRHOEA DNA SPEC QL NAA+PROBE: NEGATIVE
SPECIMEN SOURCE: NORMAL
SPECIMEN SOURCE: NORMAL

## 2018-08-16 LAB
COPATH REPORT: ABNORMAL
PAP: ABNORMAL

## 2018-08-17 PROBLEM — R87.610 PAPANICOLAOU SMEAR OF CERVIX WITH ATYPICAL SQUAMOUS CELLS OF UNDETERMINED SIGNIFICANCE (ASC-US): Status: ACTIVE | Noted: 2018-08-10

## 2018-08-17 NOTE — PROGRESS NOTES
8/10/18 ASCUS pap. Plan 1 year pap  07/25/19 MyChart pap reminder message sent. ([facility])  8/22/19  NIL pap.  Plan: pap in 1 year ([place])

## 2018-09-15 ENCOUNTER — OFFICE VISIT (OUTPATIENT)
Dept: URGENT CARE | Facility: URGENT CARE | Age: 23
End: 2018-09-15
Payer: COMMERCIAL

## 2018-09-15 VITALS
SYSTOLIC BLOOD PRESSURE: 102 MMHG | TEMPERATURE: 98.8 F | BODY MASS INDEX: 23.16 KG/M2 | HEART RATE: 77 BPM | DIASTOLIC BLOOD PRESSURE: 70 MMHG | RESPIRATION RATE: 20 BRPM | WEIGHT: 143.5 LBS

## 2018-09-15 DIAGNOSIS — R30.0 DYSURIA: ICD-10-CM

## 2018-09-15 DIAGNOSIS — R82.90 NONSPECIFIC FINDING ON EXAMINATION OF URINE: ICD-10-CM

## 2018-09-15 DIAGNOSIS — N30.00 ACUTE CYSTITIS WITHOUT HEMATURIA: Primary | ICD-10-CM

## 2018-09-15 LAB
ALBUMIN UR-MCNC: NEGATIVE MG/DL
APPEARANCE UR: CLEAR
BACTERIA #/AREA URNS HPF: ABNORMAL /HPF
BILIRUB UR QL STRIP: NEGATIVE
COLOR UR AUTO: YELLOW
GLUCOSE UR STRIP-MCNC: NEGATIVE MG/DL
HGB UR QL STRIP: ABNORMAL
KETONES UR STRIP-MCNC: NEGATIVE MG/DL
LEUKOCYTE ESTERASE UR QL STRIP: ABNORMAL
MUCOUS THREADS #/AREA URNS LPF: PRESENT /LPF
NITRATE UR QL: NEGATIVE
NON-SQ EPI CELLS #/AREA URNS LPF: ABNORMAL /LPF
PH UR STRIP: 7 PH (ref 5–7)
RBC #/AREA URNS AUTO: ABNORMAL /HPF
SOURCE: ABNORMAL
SP GR UR STRIP: 1.01 (ref 1–1.03)
UROBILINOGEN UR STRIP-ACNC: 0.2 EU/DL (ref 0.2–1)
WBC #/AREA URNS AUTO: ABNORMAL /HPF

## 2018-09-15 PROCEDURE — 87086 URINE CULTURE/COLONY COUNT: CPT | Performed by: INTERNAL MEDICINE

## 2018-09-15 PROCEDURE — 99213 OFFICE O/P EST LOW 20 MIN: CPT | Performed by: INTERNAL MEDICINE

## 2018-09-15 PROCEDURE — 81001 URINALYSIS AUTO W/SCOPE: CPT | Performed by: INTERNAL MEDICINE

## 2018-09-15 RX ORDER — NITROFURANTOIN 25; 75 MG/1; MG/1
100 CAPSULE ORAL 2 TIMES DAILY
Qty: 10 CAPSULE | Refills: 0 | Status: SHIPPED | OUTPATIENT
Start: 2018-09-15 | End: 2018-09-20

## 2018-09-15 NOTE — PROGRESS NOTES
SUBJECTIVE:  Anita Omalley, a 23 year old female, presents for evaluation of dysuria for the past week.  Associated dyspareunia.  LMP was 8/17.  Denies pelvic pain or back pain. Increased frequency and urgency. Denies diarrhea, constipation, change in appetite.  No back pain or fever. No nausea or vomiting.  She does have a prior history of UTI.  She is sexually active.  Single partner.    OBJECTIVE:  /70 (Cuff Size: Adult Regular)  Pulse 77  Temp 98.8  F (37.1  C) (Oral)  Resp 20  Wt 143 lb 8 oz (65.1 kg)  LMP 08/17/2018  BMI 23.16 kg/m2  GENERAL: healthy, alert and no distress  ABDOMEN: soft, nontender, without hepatosplenomegaly or masses and bowel sounds normal  BACK: no CVA tenderness     LAB:   Component      Latest Ref Rng & Units 9/15/2018   Color Urine       Yellow   Appearance Urine       Clear   Glucose Urine      NEG:Negative mg/dL Negative   Bilirubin Urine      NEG:Negative Negative   Ketones Urine      NEG:Negative mg/dL Negative   Specific Gravity Urine      1.003 - 1.035 1.015   pH Urine      5.0 - 7.0 pH 7.0   Protein Albumin Urine      NEG:Negative mg/dL Negative   Urobilinogen Urine      0.2 - 1.0 EU/dL 0.2   Nitrite Urine      NEG:Negative Negative   Blood Urine      NEG:Negative Trace (A)   Leukocyte Esterase Urine      NEG:Negative Moderate (A)   Source       Midstream Urine   WBC Urine      OTO5:0 - 5 /HPF 5-10 (A)   RBC Urine      OTO2:O - 2 /HPF 5-10 (A)   Squamous Epithelial /LPF Urine      FEW:Few /LPF Many (A)   Bacteria Urine      NEG:Negative /HPF Few (A)   Mucous Urine      NEG:Negative /LPF Present (A)     UC pending    ASSESSMENT/PLAN:    ICD-10-CM    1. Acute cystitis without hematuria N30.00 nitroFURantoin, macrocrystal-monohydrate, (MACROBID) 100 MG capsule   2. Dysuria R30.0 UA with Microscopic reflex to Culture   3. Nonspecific finding on examination of urine R82.90 Urine Culture Aerobic Bacterial       Jozef Abad MD

## 2018-09-15 NOTE — MR AVS SNAPSHOT
After Visit Summary   9/15/2018    Anita Omalley    MRN: [id number]           Patient Information     Date Of Birth          1995        Visit Information        Provider Department      9/15/2018 5:40 PM Jozef Abad MD [facility]        Today's Diagnoses     Acute cystitis without hematuria    -  1    Dysuria        Nonspecific finding on examination of urine          Care Instructions      Understanding Urinary Tract Infections (UTIs)  Most UTIs are caused by bacteria, although they may also be caused by viruses or fungi. Bacteria from the bowel are the most common source of infection. The infection may start because of any of the following:    Sexual activity. During sex, bacteria can travel from the penis, vagina, or rectum into the urethra.     Bacteria on the skin outside the rectum may travel into the urethra. This is more common in women since the rectum and urethra are closer to each other than in men. Wiping from front to back after using the toilet and keeping the area clean can help prevent germs from getting to the urethra.    Blockage of urine flow through the urinary tract. If urine sits too long, germs may start to grow out of control.      Parts of the urinary tract  The infection can occur in any part of the urinary tract.    The kidneys collect and store urine.    The ureters carry urine from the kidneys to the bladder.    The bladder holds urine until you are ready to let it out.    The urethra carries urine from the bladder out of the body. It is shorter in women, so bacteria can move through it more easily. The urethra is longer in men, so a UTI is less likely to reach the bladder or kidneys in men.  Date Last Reviewed: 1/1/2017 2000-2017 [employer]. [address] [place] [address], [place] [address]. All rights reserved. This information is not intended as a substitute for professional medical care. Always follow your healthcare  professional's instructions.                Follow-ups after your visit        Who to contact     If you have questions or need follow up information about today's clinic visit or your schedule please contact [facility] directly at [phone].  Normal or non-critical lab and imaging results will be communicated to you by [employer], letter or phone within 4 business days after the clinic has received the results. If you do not hear from us within 7 days, please contact the clinic through [employer] or phone. If you have a critical or abnormal lab result, we will notify you by phone as soon as possible.  Submit refill requests through [employer] or call your pharmacy and they will forward the refill request to us. Please allow 3 business days for your refill to be completed.          Additional Information About Your Visit        [employer] Information     [employer] gives you secure access to your electronic health record. If you see a primary care provider, you can also send messages to your care team and make appointments. If you have questions, please call your primary care clinic.  If you do not have a primary care provider, please call [phone] and they will assist you.        Care EveryWhere ID     This is your Care EveryWhere ID. This could be used by other organizations to access your [facility] medical records  [id number]        Your Vitals Were     Pulse Temperature Respirations Last Period BMI (Body Mass Index)       77 98.8  F (37.1  C) (Oral) 20 08/17/2018 23.16 kg/m2        Blood Pressure from Last 3 Encounters:   09/15/18 102/70   08/10/18 110/62   01/27/17 118/78    Weight from Last 3 Encounters:   09/15/18 143 lb 8 oz (65.1 kg)   08/10/18 144 lb (65.3 kg)   01/27/17 143 lb (64.9 kg)              We Performed the Following     UA with Microscopic reflex to Culture     Urine Culture Aerobic Bacterial          Today's Medication Changes          These changes are accurate as of  9/15/18  6:14 PM.  If you have any questions, ask your nurse or doctor.               Start taking these medicines.        Dose/Directions    nitroFURantoin (macrocrystal-monohydrate) 100 MG capsule   Commonly known as:  MACROBID   Used for:  Acute cystitis without hematuria   Started by:  Jozef Abad MD        Dose:  100 mg   Take 1 capsule (100 mg) by mouth 2 times daily for 5 days   Quantity:  10 capsule   Refills:  0            Where to get your medicines      These medications were sent to [facility] [id number] [facility] [address] AT [facility] [address], [facility] [address]     Phone:  [phone]     nitroFURantoin (macrocrystal-monohydrate) 100 MG capsule                Primary Care Provider Office Phone # Fax #    Charlee DIAZ Ayden Kimble [phone] [address] [facility] [address]        Equal Access to Services     [facility]: Hadii aad ku hadasho Soomaali, waaxda luqadaha, qaybta kaalmada adeegyada, waxay idiin hayaan adeeg kharare mohamud . So [facility] [phone].    ATENCIÓN: Si habla español, tiene a oglesby disposición servicios gratuitos de asistencia lingüística. Llame al [phone].    We comply with applicable federal civil rights laws and [place] laws. We do not discriminate on the basis of race, color, national origin, age, disability, sex, sexual orientation, or gender identity.            Thank you!     Thank you for choosing [facility]  for your care. Our goal is always to provide you with excellent care. Hearing back from our patients is one way we can continue to improve our services. Please take a few minutes to complete the written survey that you may receive in the mail after your visit with us. Thank you!             Your Updated Medication List - Protect others around you: Learn how to safely use, store and throw away your medicines at www.disposemymeds.org.           This list is accurate as of 9/15/18  6:14 PM.  Always use your most recent med list.                   Brand Name Dispense Instructions for use Diagnosis    nitroFURantoin (macrocrystal-monohydrate) 100 MG capsule    MACROBID    10 capsule    Take 1 capsule (100 mg) by mouth 2 times daily for 5 days    Acute cystitis without hematuria       norethindrone 0.35 MG per tablet    MICRONOR    28 tablet    Take 1 tablet (0.35 mg) by mouth daily    Encounter for initial prescription of contraceptive pills       SUMAtriptan 50 MG tablet    IMITREX     Take 1 tablet by mouth every 2 hours as needed for migraine . Max dose: 200mg per 24 hours

## 2018-09-15 NOTE — PATIENT INSTRUCTIONS
Understanding Urinary Tract Infections (UTIs)  Most UTIs are caused by bacteria, although they may also be caused by viruses or fungi. Bacteria from the bowel are the most common source of infection. The infection may start because of any of the following:    Sexual activity. During sex, bacteria can travel from the penis, vagina, or rectum into the urethra.     Bacteria on the skin outside the rectum may travel into the urethra. This is more common in women since the rectum and urethra are closer to each other than in men. Wiping from front to back after using the toilet and keeping the area clean can help prevent germs from getting to the urethra.    Blockage of urine flow through the urinary tract. If urine sits too long, germs may start to grow out of control.      Parts of the urinary tract  The infection can occur in any part of the urinary tract.    The kidneys collect and store urine.    The ureters carry urine from the kidneys to the bladder.    The bladder holds urine until you are ready to let it out.    The urethra carries urine from the bladder out of the body. It is shorter in women, so bacteria can move through it more easily. The urethra is longer in men, so a UTI is less likely to reach the bladder or kidneys in men.  Date Last Reviewed: 1/1/2017 2000-2017 [employer]. [address] [place] [address], [place] [address]. All rights reserved. This information is not intended as a substitute for professional medical care. Always follow your healthcare professional's instructions.

## 2018-09-16 LAB
BACTERIA SPEC CULT: NORMAL
SPECIMEN SOURCE: NORMAL

## 2018-10-03 ENCOUNTER — OFFICE VISIT (OUTPATIENT)
Dept: OBGYN | Facility: CLINIC | Age: 23
End: 2018-10-03
Payer: COMMERCIAL

## 2018-10-03 VITALS
WEIGHT: 147 LBS | BODY MASS INDEX: 23.63 KG/M2 | HEIGHT: 66 IN | SYSTOLIC BLOOD PRESSURE: 116 MMHG | DIASTOLIC BLOOD PRESSURE: 78 MMHG

## 2018-10-03 DIAGNOSIS — N94.10 PAIN IN FEMALE GENITALIA ON INTERCOURSE: ICD-10-CM

## 2018-10-03 DIAGNOSIS — R30.0 DYSURIA: Primary | ICD-10-CM

## 2018-10-03 LAB
ALBUMIN UR-MCNC: NEGATIVE MG/DL
APPEARANCE UR: CLEAR
BETA HCG QUAL IFA URINE: NEGATIVE
BILIRUB UR QL STRIP: NEGATIVE
COLOR UR AUTO: YELLOW
GLUCOSE UR STRIP-MCNC: NEGATIVE MG/DL
HGB UR QL STRIP: NEGATIVE
KETONES UR STRIP-MCNC: NEGATIVE MG/DL
LEUKOCYTE ESTERASE UR QL STRIP: NEGATIVE
NITRATE UR QL: NEGATIVE
PH UR STRIP: 8.5 PH (ref 5–7)
SOURCE: ABNORMAL
SP GR UR STRIP: 1.01 (ref 1–1.03)
SPECIMEN SOURCE: NORMAL
UROBILINOGEN UR STRIP-ACNC: 0.2 EU/DL (ref 0.2–1)
WET PREP SPEC: NORMAL

## 2018-10-03 PROCEDURE — 99214 OFFICE O/P EST MOD 30 MIN: CPT | Performed by: OBSTETRICS & GYNECOLOGY

## 2018-10-03 PROCEDURE — 84703 CHORIONIC GONADOTROPIN ASSAY: CPT | Performed by: OBSTETRICS & GYNECOLOGY

## 2018-10-03 PROCEDURE — 87210 SMEAR WET MOUNT SALINE/INK: CPT | Performed by: OBSTETRICS & GYNECOLOGY

## 2018-10-03 PROCEDURE — 87591 N.GONORRHOEAE DNA AMP PROB: CPT | Performed by: OBSTETRICS & GYNECOLOGY

## 2018-10-03 PROCEDURE — 87491 CHLMYD TRACH DNA AMP PROBE: CPT | Performed by: OBSTETRICS & GYNECOLOGY

## 2018-10-03 PROCEDURE — 81003 URINALYSIS AUTO W/O SCOPE: CPT | Performed by: OBSTETRICS & GYNECOLOGY

## 2018-10-03 NOTE — NURSING NOTE
"Chief Complaint   Patient presents with     Vaginal Problem     burning and painful IC       Initial /78 (BP Location: Left arm, Patient Position: Chair, Cuff Size: Adult Regular)  Ht 5' 6\" (1.676 m)  Wt 147 lb (66.7 kg)  LMP 08/17/2018 (Exact Date)  BMI 23.73 kg/m2 Estimated body mass index is 23.73 kg/(m^2) as calculated from the following:    Height as of this encounter: 5' 6\" (1.676 m).    Weight as of this encounter: 147 lb (66.7 kg).  BP completed using cuff size: regular    No obstetric history on file.    The following HM Due: NONE      Patti Galindo CMA         "

## 2018-10-03 NOTE — MR AVS SNAPSHOT
"              After Visit Summary   10/3/2018    Anita Omalley    MRN: [id number]           Patient Information     Date Of Birth          1995        Visit Information        Provider Department      10/3/2018 1:00 PM Svetlana Cobb DO [facility]        Today's Diagnoses     Dysuria    -  1    Pain in female genitalia on intercourse           Follow-ups after your visit        Who to contact     If you have questions or need follow up information about today's clinic visit or your schedule please contact [facility] directly at [phone].  Normal or non-critical lab and imaging results will be communicated to you by [employer], letter or phone within 4 business days after the clinic has received the results. If you do not hear from us within 7 days, please contact the clinic through [employer] or phone. If you have a critical or abnormal lab result, we will notify you by phone as soon as possible.  Submit refill requests through [employer] or call your pharmacy and they will forward the refill request to us. Please allow 3 business days for your refill to be completed.          Additional Information About Your Visit        MyChart Information     [employer] gives you secure access to your electronic health record. If you see a primary care provider, you can also send messages to your care team and make appointments. If you have questions, please call your primary care clinic.  If you do not have a primary care provider, please call [phone] and they will assist you.        Care EveryWhere ID     This is your Care EveryWhere ID. This could be used by other organizations to access your [place] medical records  [id number]        Your Vitals Were     Height Last Period BMI (Body Mass Index)             5' 6\" (1.676 m) 08/17/2018 (Exact Date) 23.73 kg/m2          Blood Pressure from Last 3 Encounters:   10/03/18 116/78   09/15/18 102/70   08/10/18 110/62    " Weight from Last 3 Encounters:   10/03/18 147 lb (66.7 kg)   09/15/18 143 lb 8 oz (65.1 kg)   08/10/18 144 lb (65.3 kg)              We Performed the Following     *UA reflex to Microscopic and Culture ([place] and [facility] (except [place] and [place])     Beta HCG qual IFA urine - [facility] and [place]     CHLAMYDIA TRACHOMATIS PCR     NEISSERIA GONORRHOEA PCR     Wet  Prep        Primary Care Provider Office Phone # Fax #    Charlee DIAZ Ayden Kimble [phone] [address]  [facility] [address]        Equal Access to Services     [facility]: Hadii aad ku hadasho Soomaali, waaxda luqadaha, qaybta kaalmada adeegyada, silvia mohamud . So [facility] [phone].    ATENCIÓN: Si habla español, tiene a oglesby disposición servicios gratuitos de asistencia lingüística. Ll[facility] [phone].    We comply with applicable federal civil rights laws and [place] laws. We do not discriminate on the basis of race, color, national origin, age, disability, sex, sexual orientation, or gender identity.            Thank you!     Thank you for choosing [facility]  for your care. Our goal is always to provide you with excellent care. Hearing back from our patients is one way we can continue to improve our services. Please take a few minutes to complete the written survey that you may receive in the mail after your visit with us. Thank you!             Your Updated Medication List - Protect others around you: Learn how to safely use, store and throw away your medicines at www.disposemymeds.org.          This list is accurate as of 10/3/18  1:54 PM.  Always use your most recent med list.                   Brand Name Dispense Instructions for use Diagnosis    norethindrone 0.35 MG per tablet    MICRONOR    28 tablet    Take 1 tablet (0.35 mg) by mouth daily    Encounter for initial prescription of contraceptive pills       SUMAtriptan 50 MG tablet     IMITREX     Take 1 tablet by mouth every 2 hours as needed for migraine . Max dose: 200mg per 24 hours

## 2018-10-03 NOTE — PROGRESS NOTES
SUBJECTIVE:                                                   Anita Omalley is a 23 year old female who presents to clinic today for the following health issue(s):  Patient presents with:  Vaginal Problem: burning and painful IC      HPI:  Reports dysuria since 9/15. Went to ED that day and was started on Macrobid for a urinary tract infection based on UA.   Also notes burning that continues after urination.     Had a UTI three years ago. No h/o recurrent UTIs.    No flank or back pain. No fever, chills, nausea, vomiting.   Denies change in vaginal discharge, pruritis, or malodor. She was diagnosed with Bacterial Vaginosis (BV) in Aug and was prescribed Metrogel.    Patient's last menstrual period was 08/17/2018 (exact date). No period in two months. Periods have been irregular.  Patient is sexually active, G0. Monogamous relationship. One partner last three months.  Using Micronor for contraception. Has been on this for two months. Has a history of migraines (nothing in last 5 years). Denies h/o auras.  STI testing in Aug resulted negative.     Problem list and histories reviewed & adjusted, as indicated.  Additional history: as documented.    Patient Active Problem List   Diagnosis     Allergic state     Anemia     Anxiety state     Dysmenorrhea     Excessive or frequent menstruation     Migraine     Chronic rhinitis     Allergy     Menorrhagia     Migraine headache     Papanicolaou smear of cervix with atypical squamous cells of undetermined significance (ASC-US)     Past Surgical History:   Procedure Laterality Date     ADENOIDECTOMY      2012,Also tuminoplasty scheduled      Social History   Substance Use Topics     Smoking status: Passive Smoke Exposure - Never Smoker     Smokeless tobacco: Never Used     Alcohol use No      No data available              Current Outpatient Prescriptions on File Prior to Visit:  norethindrone (MICRONOR) 0.35 MG per tablet Take 1 tablet (0.35 mg) by mouth daily  "  SUMAtriptan (IMITREX) 50 MG tablet Take 1 tablet by mouth every 2 hours as needed for migraine . Max dose: 200mg per 24 hours     No current facility-administered medications on file prior to visit.   No Known Allergies    ROS:  12 point ROS negative except as noted above in HPI, including Gen., Resp., CV, GI &  system review.    OBJECTIVE:     /78 (BP Location: Left arm, Patient Position: Chair, Cuff Size: Adult Regular)  Ht 5' 6\" (1.676 m)  Wt 147 lb (66.7 kg)  LMP 08/17/2018 (Exact Date)  BMI 23.73 kg/m2   BMI: Body mass index is 23.73 kg/(m^2).  General: Alert and oriented, no distress. Normal body habitus.  Psychiatric: Flat affect vs. Nervous for visit.  Skin: Warm and dry, no lesions, rashes or discolorations.  Back: no tenderness, no CVA tenderness  Vulva:  No external lesions, normal female hair distribution, no inguinal adenopathy, no tenderness or discomfort with examination  Urethra:  Midline, non-tender, well supported, no discharge  Vagina:  Well-estrogenized, no lesions, thin watery yellow tinged discharge  Cervix: no lesions, no discharge, no cervical motion tenderness  Uterus:  anteverted, smooth contour, without enlargement, mobile, and without tenderness  Ovaries:  No masses appreciated, non-tender  Rectal Exam: No external hemorrhoids noted  Musculoskeletal: extremities normal, no pelvic floor tenderness    In-Clinic Test Results:  Results for orders placed or performed in visit on 10/03/18 (from the past 24 hour(s))   *UA reflex to Microscopic and Culture ([place] and [facility] (except [place] and [place])   Result Value Ref Range    Color Urine Yellow     Appearance Urine Clear     Glucose Urine Negative NEG^Negative mg/dL    Bilirubin Urine Negative NEG^Negative    Ketones Urine Negative NEG^Negative mg/dL    Specific Gravity Urine 1.015 1.003 - 1.035    Blood Urine Negative NEG^Negative    pH Urine 8.5 (H) 5.0 - 7.0 pH    Protein Albumin Urine Negative NEG^Negative " mg/dL    Urobilinogen Urine 0.2 0.2 - 1.0 EU/dL    Nitrite Urine Negative NEG^Negative    Leukocyte Esterase Urine Negative NEG^Negative    Source Midstream Urine    Beta HCG qual IFA urine - [facility] and [place]   Result Value Ref Range    Beta HCG Qual IFA Urine Negative NEG^Negative          ASSESSMENT/PLAN:                                                        ICD-10-CM    1. Dysuria R30.0 *UA reflex to Microscopic and Culture ([place] and [facility] (except [place] and [place])     Beta HCG qual IFA urine - [facility] and [place]     NEISSERIA GONORRHOEA PCR     CHLAMYDIA TRACHOMATIS PCR     Wet  Prep   2. Pain in female genitalia on intercourse N94.10      No period in two months.  Patient feels this is secondary to new birth control. Pregnancy test negative. She does have a history of irregular menses. Asked to follow up if period does not occur.   Urine culture obtained in ED reviewed - -negative for UTI.   Repeat urine collected UTI - not consistent with UTI.   Gonorrhea and chlamydia testing obtained today.   Wet prep obtained.   No tenderness or other concerning pelvic exam findings.  Will treat based on the results of the above.   If negative and symptoms continue, schedule follow up appointment.  May try Azo OTC  Call if temperature over 100.3, worsening pain, change in symptoms.     Total face to face time 25 minutes, with > 50% spent counseling and/or coordination of care.      Svetlana Cobb,   [facility]

## 2019-05-13 ENCOUNTER — OFFICE VISIT (OUTPATIENT)
Dept: DERMATOLOGY | Facility: CLINIC | Age: 24
End: 2019-05-13
Payer: COMMERCIAL

## 2019-05-13 VITALS — OXYGEN SATURATION: 97 % | DIASTOLIC BLOOD PRESSURE: 73 MMHG | HEART RATE: 86 BPM | SYSTOLIC BLOOD PRESSURE: 116 MMHG

## 2019-05-13 DIAGNOSIS — L70.0 ACNE VULGARIS: Primary | ICD-10-CM

## 2019-05-13 DIAGNOSIS — L81.0 POST-INFLAMMATORY HYPERPIGMENTATION: ICD-10-CM

## 2019-05-13 PROCEDURE — 99203 OFFICE O/P NEW LOW 30 MIN: CPT | Performed by: PHYSICIAN ASSISTANT

## 2019-05-13 RX ORDER — BENZOYL PEROXIDE 10 G/100G
SUSPENSION TOPICAL
Qty: 226 G | Refills: 3 | Status: SHIPPED | OUTPATIENT
Start: 2019-05-13 | End: 2020-07-17

## 2019-05-13 RX ORDER — TRETINOIN 0.25 MG/G
CREAM TOPICAL
Qty: 45 G | Refills: 3 | Status: SHIPPED | OUTPATIENT
Start: 2019-05-13 | End: 2020-07-17

## 2019-05-13 RX ORDER — MINOCYCLINE HYDROCHLORIDE 100 MG/1
TABLET ORAL
Qty: 60 TABLET | Refills: 2 | Status: SHIPPED | OUTPATIENT
Start: 2019-05-13 | End: 2019-07-08

## 2019-05-13 NOTE — PATIENT INSTRUCTIONS
Acne vulgaris, PIH    Morning regimen:    Wash with either a gentle cleanser such as Cetaphil gentle cleanser or Benzoyl peroxide wash 5% to face and 10% to body  Apply a gentle moisturizer*  Take minocycline     Evening regimen:    Wash with either a gentle cleanser such as Cetaphil gentle cleanser or Benzoyl peroxide wash 5%  Apply tretinoin  Apply a gentle moisturizer (do not need sunscreen at night)  Take minocycline    *Facial moisturizer (preferably with an SPF of 30 or greater) such as Cetaphil, CeraVe, or Vanicream. Make sure lotion is non-comedogenic. Sunscreen active ingredients: Physical blockers are less likely to clog pores. Examples include titanium dioxide and zinc oxide  Good body moisturizers include Cetaphil, CeraVe, Eucerin, and Vanicream.    Disc Tretinoin at bedtime, dryness, irritation and way to prevent discussed with patient   Benzoyl Peroxide (BPO) wash daily or every other day depending on dryness  (2.5%-5% BPO on face and 5%-10% on chest and back)  Aggressive use of bland emollients discussed with patient   If taking Doxycycline take with food but avoid calcium-rich foods as this can reduce the efficacy of Doxycycline. Side effects of doxycyline GI upset, esophagitis, and sun sensitivity.   Potential side effects of oral antibiotic N/V/D, rash, allergic reaction, pigment changes, and dizziness.     May take 2-3 months to see 50-70% improvement  May get worse during initial phase of treatment    Pathophysiology discussed with patient and information provided.  I discussed with patient oral versus topical treatments such as oral antibiotics, Spironolactone (Aldactone)(women only),oral contraceptive pills (women only) topical creams,  and over-the-counter treatments.     Acne can be effectively treated, although response may sometimes be slow.   Where possible, avoid excessively humid conditions such as a sauna, working in an unventilated kitchen or tropical vacations.   If you smoke, stop.  Nicotine increases sebum retention and increased scale within the follicles, forming comedones (black and whiteheads).    Minimize the application of oils and cosmetics to the affected skin.  Abrasive skin treatments can aggravate acne.   Try not to scratch or pick the spots as this can increase your risk for permanent scarring in the area.   To avoid sunburn, protect your skin outdoors using a broad spectrum (UVB and UVA) sunscreen and protective clothing.  No relationship between particular foods and acne has been proven. However, reports suggest low glycemic and low dairy diet are helpful for some people.    ACNE INFORMATION     Acne is a skin disease affecting oil glands and hair follicles in your skin.  When these pores do not drain properly, hair follicles can becomes clogged and bacteria can be trapped causing inflammation to occur. Clinical manifestations range from mild to severe, such as comedones (whiteheads and blackheads) or cysts.     Several factors contribute to acne:     1. Hormonal- Androgen (a type of hormone) can cause oil glands to enlarges and produces more sebum (oil).    2. Bacterial- A specific type of bacteria called Propionibacterium acnes are found in these oily follicles and stimulate more inflammation.     3. Genetics- History of family members (parents or siblings)     4. External factors- mechanical trauma, cosmetics, topical steroids or some oral medications.      Combination therapy is the mainstay of treatment given the multiple factors contributing to acne.  The type of treatment is also dependant on whether you are pregnancy or breastfeeding.     TOPICAL TREATMENTS   Topical Antibiotics These medications help prevent growth of bacteria in your pores.   Topical Exfoliating Agent These are drying agents that will help normalize oil production, unplug pores and reduce bacterial growth. (Note: Make sure you discontinue this medication ONE week prior to waxing or your skin may lift.)      ORAL TREATMENTS   Oral Antibiotics: Tetracyclines are the most commonly used oral antibiotics to treat moderate to severe acne. They are safe to take for months at a time. Some side effects to these medications include: sun sensitivity, stomach upset, dizziness and heartburn. If you experience a sudden onset of rash or severe unusual headache, discontinue the medication and contact your clinician immediately.     Spironolactone: This oral medication blocks androgens (hormones that can aggravate acne).  Since this medication is also used as a diuretic, baseline blood work is needed to check your electrolytes and liver function. Do not get pregnant while on this medication as it can cause birth defects. Make sure to drink plenty of water.    Birth Control Pills (Females only). In order to decrease hormonal fluctuations that affect acne, birth control pills such as Raissa  or Anai  ( and others) can be effective in treating acne.  We advise you to discuss with your OB/GYN or Primary Care Doctor to be screened and to check if you are eligible to be on this pill.     Accutane  (generic: isotretinoin). This oral medication is a retinoid (Vitamin A derivative like Retin-A) used to treat severe acne that does not respond to the above medications. Accutane  can  help clear acne for years and the average period of treatment is five to seven months, however, the duration of treatment may be adjusted based on severity. Some people may need more than one treatment.     ALTERNATIVE TREATMENTS   Microdermabrasion & light chemical peels help improve skin texture, decrease pore size, decrease mild scarring & increase absorption of your topical treatments     Blue Spectrum Light Therapy consists of a concentrated visible light that kills bacteria in the oil ducts. It is safe for pregnancy & nursing. At times, ALA (a topical solution) may be applied prior to the Blue Light exposure in order to enhance light penetration.     Pulse  Dye Laser (PDL) decreases inflammation and improves certain acne scars.     Recommended facial cleansers, moisturizers & cosmetics:   Cetaphil  CeraVe  (Note: Look for  non-comeodogenic  cleansers & moisturizers)   Clinique , Prescriptives  make-up

## 2019-05-13 NOTE — PROGRESS NOTES
HPI:  Anita Omalley is a 24 year old female patient here today for acne of face, chest, back .  Patient states this has been present for 5 years.  Patient reports the following symptoms: breakout .  Patient reports the following previous treatments: none.  Patient reports the following modifying factors: none.  Associated symptoms: none.  Patient has no other skin complaints today.  Remainder of the HPI, Meds, PMH, Allergies, FH, and SH was reviewed in chart.      Past Medical History:   Diagnosis Date     Abnormal Pap smear of cervix 08/10/2018    see problem list     Anxiety disorder     No Comments Provided     Chronic rhinitis     No Comments Provided     Conjunctivitis     2004,Recurrent     Dysmenorrhea     No Comments Provided     Excessive and frequent menstruation with regular cycle     No Comments Provided       Past Surgical History:   Procedure Laterality Date     ADENOIDECTOMY      2012,Also tuminoplasty scheduled        Family History   Problem Relation Age of Onset     Skin Cancer No family hx of        Social History     Socioeconomic History     Marital status: Single     Spouse name: GIANNI:  Michael Cordova     Number of children: Not on file     Years of education: 12+     Highest education level: Not on file   Occupational History     Employer: [employer]   Social Needs     Financial resource strain: Not on file     Food insecurity:     Worry: Not on file     Inability: Not on file     Transportation needs:     Medical: Not on file     Non-medical: Not on file   Tobacco Use     Smoking status: Passive Smoke Exposure - Never Smoker     Smokeless tobacco: Never Used   Substance and Sexual Activity     Alcohol use: No     Alcohol/week: 0.0 oz     Drug use: No     Comment: Drug use: No     Sexual activity: Yes     Partners: Male     Birth control/protection: Condom   Lifestyle     Physical activity:     Days per week: Not on file     Minutes per session: Not on file     Stress: Not on file    Relationships     Social connections:     Talks on phone: Not on file     Gets together: Not on file     Attends Sikhism service: Not on file     Active member of club or organization: Not on file     Attends meetings of clubs or organizations: Not on file     Relationship status: Not on file     Intimate partner violence:     Fear of current or ex partner: Not on file     Emotionally abused: Not on file     Physically abused: Not on file     Forced sexual activity: Not on file   Other Topics Concern     Parent/sibling w/ CABG, MI or angioplasty before 65F 55M? Not Asked   Social History Narrative    Lives with mother and stepfather and four siblings.  Graduated [employer].  Regular contact with father.       Outpatient Encounter Medications as of 5/13/2019   Medication Sig Dispense Refill     norethindrone (MICRONOR) 0.35 MG per tablet Take 1 tablet (0.35 mg) by mouth daily 28 tablet 12     SUMAtriptan (IMITREX) 50 MG tablet Take 1 tablet by mouth every 2 hours as needed for migraine . Max dose: 200mg per 24 hours       No facility-administered encounter medications on file as of 5/13/2019.        Review Of Systems:  Skin: As above  Eyes: negative  Ears/Nose/Throat: negative  Respiratory: No shortness of breath, dyspnea on exertion, cough, or hemoptysis  Cardiovascular: negative  Gastrointestinal: negative  Genitourinary: negative  Musculoskeletal: negative  Neurologic: negative  Psychiatric: negative  Hematologic/Lymphatic/Immunologic: negative  Endocrine: negative      Objective:     /73   Pulse 86   SpO2 97%   Breastfeeding? No   Eyes: Conjunctivae/lids: Normal   ENT: Lips:  Normal  MSK: Normal  Cardiovascular: Peripheral edema none  Pulm: Breathing Normal  Neuro/Psych: Orientation: Normal; Mood/Affect: Normal, NAD, WDWN  Pt accompanied by: self  Following areas examined: face, neck, chest, back  Donohue skin type:ii   Findings:  multiple inflammatory papules on back, chest, and few  on face  Light brown/pink smooth macules on face, back, and chest  Open and closed comedones on face    Assessment and Plan:  1) Acne vulgaris, PIH    Morning regimen:    Wash with either a gentle cleanser such as Cetaphil gentle cleanser or Benzoyl peroxide wash 5% to face and 10% to body  Apply a gentle moisturizer*  Take minocycline     Evening regimen:    Wash with either a gentle cleanser such as Cetaphil gentle cleanser or Benzoyl peroxide wash 5%  Apply tretinoin  Apply a gentle moisturizer (do not need sunscreen at night)  Take minocycline    *Facial moisturizer (preferably with an SPF of 30 or greater) such as Cetaphil, CeraVe, or Vanicream. Make sure lotion is non-comedogenic. Sunscreen active ingredients: Physical blockers are less likely to clog pores. Examples include titanium dioxide and zinc oxide  Good body moisturizers include Cetaphil, CeraVe, Eucerin, and Vanicream.    Disc Tretinoin at bedtime, dryness, irritation and way to prevent discussed with patient   Benzoyl Peroxide (BPO) wash daily or every other day depending on dryness  (2.5%-5% BPO on face and 5%-10% on chest and back)  Aggressive use of bland emollients discussed with patient   If taking Doxycycline take with food but avoid calcium-rich foods as this can reduce the efficacy of Doxycycline. Side effects of doxycyline GI upset, esophagitis, and sun sensitivity.   Potential side effects of oral antibiotic N/V/D, rash, allergic reaction, pigment changes, and dizziness.     May take 2-3 months to see 50-70% improvement  May get worse during initial phase of treatment    Pathophysiology discussed with patient and information provided.  I discussed with patient oral versus topical treatments such as oral antibiotics, Spironolactone (Aldactone)(women only),oral contraceptive pills (women only) topical creams,  and over-the-counter treatments.     Acne can be effectively treated, although response may sometimes be slow.   Where possible,  avoid excessively humid conditions such as a sauna, working in an unventilated kitchen or tropical vacations.   If you smoke, stop. Nicotine increases sebum retention and increased scale within the follicles, forming comedones (black and whiteheads).    Minimize the application of oils and cosmetics to the affected skin.  Abrasive skin treatments can aggravate acne.   Try not to scratch or pick the spots as this can increase your risk for permanent scarring in the area.   To avoid sunburn, protect your skin outdoors using a broad spectrum (UVB and UVA) sunscreen and protective clothing.  No relationship between particular foods and acne has been proven. However, reports suggest low glycemic and low dairy diet are helpful for some people.   Follow up in 2-3 months

## 2019-05-13 NOTE — LETTER
5/13/2019         RE: Anita Omalley  [address] [facility] [address]        Dear Colleague,    Thank you for referring your patient, Anita Omalley, to the [facility]. Please see a copy of my visit note below.    HPI:  Anita Omalley is a 24 year old female patient here today for acne of face, chest, back .  Patient states this has been present for 5 years.  Patient reports the following symptoms: breakout .  Patient reports the following previous treatments: none.  Patient reports the following modifying factors: none.  Associated symptoms: none.  Patient has no other skin complaints today.  Remainder of the HPI, Meds, PMH, Allergies, FH, and SH was reviewed in chart.      Past Medical History:   Diagnosis Date     Abnormal Pap smear of cervix 08/10/2018    see problem list     Anxiety disorder     No Comments Provided     Chronic rhinitis     No Comments Provided     Conjunctivitis     2004,Recurrent     Dysmenorrhea     No Comments Provided     Excessive and frequent menstruation with regular cycle     No Comments Provided       Past Surgical History:   Procedure Laterality Date     ADENOIDECTOMY      2012,Also tuminoplasty scheduled        Family History   Problem Relation Age of Onset     Skin Cancer No family hx of        Social History     Socioeconomic History     Marital status: Single     Spouse name: SO:  Michael Cordova     Number of children: Not on file     Years of education: 12+     Highest education level: Not on file   Occupational History     Employer: [employer]   Social Needs     Financial resource strain: Not on file     Food insecurity:     Worry: Not on file     Inability: Not on file     Transportation needs:     Medical: Not on file     Non-medical: Not on file   Tobacco Use     Smoking status: Passive Smoke Exposure - Never Smoker     Smokeless tobacco: Never Used   Substance and Sexual Activity     Alcohol use: No     Alcohol/week: 0.0 oz     Drug  use: No     Comment: Drug use: No     Sexual activity: Yes     Partners: Male     Birth control/protection: Condom   Lifestyle     Physical activity:     Days per week: Not on file     Minutes per session: Not on file     Stress: Not on file   Relationships     Social connections:     Talks on phone: Not on file     Gets together: Not on file     Attends Worship service: Not on file     Active member of club or organization: Not on file     Attends meetings of clubs or organizations: Not on file     Relationship status: Not on file     Intimate partner violence:     Fear of current or ex partner: Not on file     Emotionally abused: Not on file     Physically abused: Not on file     Forced sexual activity: Not on file   Other Topics Concern     Parent/sibling w/ CABG, MI or angioplasty before 65F 55M? Not Asked   Social History Narrative    Lives with mother and stepfather and four siblings.  Graduated [employer].  Regular contact with father.       Outpatient Encounter Medications as of 5/13/2019   Medication Sig Dispense Refill     norethindrone (MICRONOR) 0.35 MG per tablet Take 1 tablet (0.35 mg) by mouth daily 28 tablet 12     SUMAtriptan (IMITREX) 50 MG tablet Take 1 tablet by mouth every 2 hours as needed for migraine . Max dose: 200mg per 24 hours       No facility-administered encounter medications on file as of 5/13/2019.        Review Of Systems:  Skin: As above  Eyes: negative  Ears/Nose/Throat: negative  Respiratory: No shortness of breath, dyspnea on exertion, cough, or hemoptysis  Cardiovascular: negative  Gastrointestinal: negative  Genitourinary: negative  Musculoskeletal: negative  Neurologic: negative  Psychiatric: negative  Hematologic/Lymphatic/Immunologic: negative  Endocrine: negative      Objective:     /73   Pulse 86   SpO2 97%   Breastfeeding? No   Eyes: Conjunctivae/lids: Normal   ENT: Lips:  Normal  MSK: Normal  Cardiovascular: Peripheral edema none  Pulm: Breathing  Normal  Neuro/Psych: Orientation: Normal; Mood/Affect: Normal, NAD, WDWN  Pt accompanied by: self  Following areas examined: face, neck, chest, back  Donohue skin type:ii   Findings:  multiple inflammatory papules on back, chest, and few on face  Light brown/pink smooth macules on face, back, and chest  Open and closed comedones on face    Assessment and Plan:  1) Acne vulgaris, PIH    Morning regimen:    Wash with either a gentle cleanser such as Cetaphil gentle cleanser or Benzoyl peroxide wash 5% to face and 10% to body  Apply a gentle moisturizer*  Take minocycline     Evening regimen:    Wash with either a gentle cleanser such as Cetaphil gentle cleanser or Benzoyl peroxide wash 5%  Apply tretinoin  Apply a gentle moisturizer (do not need sunscreen at night)  Take minocycline    *Facial moisturizer (preferably with an SPF of 30 or greater) such as Cetaphil, CeraVe, or Vanicream. Make sure lotion is non-comedogenic. Sunscreen active ingredients: Physical blockers are less likely to clog pores. Examples include titanium dioxide and zinc oxide  Good body moisturizers include Cetaphil, CeraVe, Eucerin, and Vanicream.    Disc Tretinoin at bedtime, dryness, irritation and way to prevent discussed with patient   Benzoyl Peroxide (BPO) wash daily or every other day depending on dryness  (2.5%-5% BPO on face and 5%-10% on chest and back)  Aggressive use of bland emollients discussed with patient   If taking Doxycycline take with food but avoid calcium-rich foods as this can reduce the efficacy of Doxycycline. Side effects of doxycyline GI upset, esophagitis, and sun sensitivity.   Potential side effects of oral antibiotic N/V/D, rash, allergic reaction, pigment changes, and dizziness.     May take 2-3 months to see 50-70% improvement  May get worse during initial phase of treatment    Pathophysiology discussed with patient and information provided.  I discussed with patient oral versus topical treatments such as  oral antibiotics, Spironolactone (Aldactone)(women only),oral contraceptive pills (women only) topical creams,  and over-the-counter treatments.     Acne can be effectively treated, although response may sometimes be slow.   Where possible, avoid excessively humid conditions such as a sauna, working in an unventilated kitchen or tropical vacations.   If you smoke, stop. Nicotine increases sebum retention and increased scale within the follicles, forming comedones (black and whiteheads).    Minimize the application of oils and cosmetics to the affected skin.  Abrasive skin treatments can aggravate acne.   Try not to scratch or pick the spots as this can increase your risk for permanent scarring in the area.   To avoid sunburn, protect your skin outdoors using a broad spectrum (UVB and UVA) sunscreen and protective clothing.  No relationship between particular foods and acne has been proven. However, reports suggest low glycemic and low dairy diet are helpful for some people.   Follow up in 2-3 months      Again, thank you for allowing me to participate in the care of your patient.        Sincerely,        Micheline Bang PA-C

## 2019-07-08 ENCOUNTER — OFFICE VISIT (OUTPATIENT)
Dept: DERMATOLOGY | Facility: CLINIC | Age: 24
End: 2019-07-08
Payer: COMMERCIAL

## 2019-07-08 VITALS — HEART RATE: 68 BPM | OXYGEN SATURATION: 99 % | SYSTOLIC BLOOD PRESSURE: 103 MMHG | DIASTOLIC BLOOD PRESSURE: 67 MMHG

## 2019-07-08 DIAGNOSIS — L81.0 POST-INFLAMMATORY HYPERPIGMENTATION: ICD-10-CM

## 2019-07-08 DIAGNOSIS — Z79.899 MEDICATION MANAGEMENT: ICD-10-CM

## 2019-07-08 DIAGNOSIS — L70.0 ACNE VULGARIS: Primary | ICD-10-CM

## 2019-07-08 LAB
CREAT SERPL-MCNC: 0.7 MG/DL (ref 0.52–1.04)
GFR SERPL CREATININE-BSD FRML MDRD: >90 ML/MIN/{1.73_M2}
POTASSIUM SERPL-SCNC: 4.1 MMOL/L (ref 3.4–5.3)

## 2019-07-08 PROCEDURE — 84132 ASSAY OF SERUM POTASSIUM: CPT | Performed by: PHYSICIAN ASSISTANT

## 2019-07-08 PROCEDURE — 99213 OFFICE O/P EST LOW 20 MIN: CPT | Performed by: PHYSICIAN ASSISTANT

## 2019-07-08 PROCEDURE — 36415 COLL VENOUS BLD VENIPUNCTURE: CPT | Performed by: PHYSICIAN ASSISTANT

## 2019-07-08 PROCEDURE — 82565 ASSAY OF CREATININE: CPT | Performed by: PHYSICIAN ASSISTANT

## 2019-07-08 RX ORDER — CLINDAMYCIN PHOSPHATE 10 MG/G
GEL TOPICAL
Qty: 60 G | Refills: 3 | Status: SHIPPED | OUTPATIENT
Start: 2019-07-08 | End: 2020-07-17

## 2019-07-08 RX ORDER — SPIRONOLACTONE 50 MG/1
50 TABLET, FILM COATED ORAL DAILY
Qty: 30 TABLET | Refills: 2 | Status: SHIPPED | OUTPATIENT
Start: 2019-07-08 | End: 2020-07-17

## 2019-07-08 RX ORDER — TRETINOIN 0.5 MG/G
CREAM TOPICAL
Qty: 45 G | Refills: 3 | Status: SHIPPED | OUTPATIENT
Start: 2019-07-08 | End: 2020-07-17

## 2019-07-08 NOTE — PROGRESS NOTES
HPI:  Anita Omalley is a 24 year old female patient here today for follow up acne on face, chest, and back .  Patient states this has been present for a while.  Patient reports the following symptoms: great improvement on face and chest. Pt still breaking out on back .  Patient reports the following previous treatments: lov start bpo wash, minocycline oral, tretinoin 0.025%. No issues with minocycline.  Patient reports the following modifying factors: none.  Associated symptoms: none.  Patient has no other skin complaints today.  Remainder of the HPI, Meds, PMH, Allergies, FH, and SH was reviewed in chart.    Pertinent Hx:   Acne vulgaris  Past Medical History:   Diagnosis Date     Abnormal Pap smear of cervix 08/10/2018    see problem list     Anxiety disorder     No Comments Provided     Chronic rhinitis     No Comments Provided     Conjunctivitis     2004,Recurrent     Dysmenorrhea     No Comments Provided     Excessive and frequent menstruation with regular cycle     No Comments Provided       Past Surgical History:   Procedure Laterality Date     ADENOIDECTOMY      2012,Also tuminoplasty scheduled        Family History   Problem Relation Age of Onset     Skin Cancer No family hx of        Social History     Socioeconomic History     Marital status: Single     Spouse name: SO:  Michael Cordova     Number of children: Not on file     Years of education: 12+     Highest education level: Not on file   Occupational History     Employer: [employer]   Social Needs     Financial resource strain: Not on file     Food insecurity:     Worry: Not on file     Inability: Not on file     Transportation needs:     Medical: Not on file     Non-medical: Not on file   Tobacco Use     Smoking status: Passive Smoke Exposure - Never Smoker     Smokeless tobacco: Never Used   Substance and Sexual Activity     Alcohol use: No     Alcohol/week: 0.0 oz     Drug use: No     Comment: Drug use: No     Sexual activity: Yes      Partners: Male     Birth control/protection: Condom   Lifestyle     Physical activity:     Days per week: Not on file     Minutes per session: Not on file     Stress: Not on file   Relationships     Social connections:     Talks on phone: Not on file     Gets together: Not on file     Attends Voodoo service: Not on file     Active member of club or organization: Not on file     Attends meetings of clubs or organizations: Not on file     Relationship status: Not on file     Intimate partner violence:     Fear of current or ex partner: Not on file     Emotionally abused: Not on file     Physically abused: Not on file     Forced sexual activity: Not on file   Other Topics Concern     Parent/sibling w/ CABG, MI or angioplasty before 65F 55M? Not Asked   Social History Narrative    Lives with mother and stepfather and four siblings.  Graduated [employer].  Regular contact with father.       Outpatient Encounter Medications as of 7/8/2019   Medication Sig Dispense Refill     benzoyl peroxide (BENZOYL PEROXIDE WASH) 10 % external liquid Wash area affected by acne on trunk and extremities once a day in the shower. 226 g 3     benzoyl peroxide (BENZOYL PEROXIDE WASH) 5 % external liquid Wash area affected on face by acne once a day in the shower. 226 g 3     clindamycin (CLINDAMAX) 1 % external gel Apply to area affected by acne in the am. 60 g 3     norethindrone (MICRONOR) 0.35 MG per tablet Take 1 tablet (0.35 mg) by mouth daily 28 tablet 12     spironolactone (ALDACTONE) 50 MG tablet Take 1 tablet (50 mg) by mouth daily 30 tablet 2     SUMAtriptan (IMITREX) 50 MG tablet Take 1 tablet by mouth every 2 hours as needed for migraine . Max dose: 200mg per 24 hours       tretinoin (RETIN-A) 0.025 % external cream Apply a pea-sized amount to each area affected by acne. Start every 3-4 nights working up to every night. 45 g 3     tretinoin (RETIN-A) 0.05 % external cream Apply a pea-sized amount to each area  affected by acne. Start every 3-4 nights working up to every night. 45 g 3     [DISCONTINUED] minocycline (DYNACIN) 100 MG tablet Take one by mouth in the AM and one in the PM. 60 tablet 2     No facility-administered encounter medications on file as of 7/8/2019.        Review Of Systems:  Skin: As above  Eyes: negative  Ears/Nose/Throat: negative  Respiratory: No shortness of breath, dyspnea on exertion, cough, or hemoptysis  Cardiovascular: negative  Gastrointestinal: negative  Genitourinary: negative  Musculoskeletal: negative  Neurologic: negative  Psychiatric: negative  Hematologic/Lymphatic/Immunologic: negative  Endocrine: negative      Objective:     /67   Pulse 68   SpO2 99%   Eyes: Conjunctivae/lids: Normal   ENT: Lips:  Normal  MSK: Normal  Cardiovascular: Peripheral edema none  Pulm: Breathing Normal  Neuro/Psych: Orientation: Normal; Mood/Affect: Normal, NAD, WDWN  Pt accompanied by: self  Following areas examined: face, chest, neck, back  Donohue skin type:ii   Findings:  Multiple inflammatory papules and Light brown/pink smooth macules on back  Few Light brown/pink smooth macules on face  Assessment and Plan:  1) Acne vulgaris, PIH  Stop minocycline  Cr and k today    Morning regimen:    Wash with either a gentle cleanser such as Cetaphil gentle cleanser or Benzoyl peroxide wash 5%  Apply clindamycin  Apply a gentle moisturizer*      Evening regimen:    Wash with either a gentle cleanser such as Cetaphil gentle cleanser or Benzoyl peroxide wash 5%  Apply tretinoin 0.05%  Apply a gentle moisturizer (do not need sunscreen at night)  Take spironolactone    *Facial moisturizer (preferably with an SPF of 30 or greater) such as Cetaphil, CeraVe, or Vanicream. Make sure lotion is non-comedogenic. Sunscreen active ingredients: Physical blockers are less likely to clog pores. Examples include titanium dioxide and zinc oxide  Good body moisturizers include Cetaphil, CeraVe, Eucerin, and  Vanicream.    Disc Tretinoin at bedtime, dryness, irritation and way to prevent discussed with patient   Benzoyl Peroxide (BPO) wash daily or every other day depending on dryness  (2.5%-5% BPO on face and 5%-10% on chest and back)  Aggressive use of bland emollients discussed with patient   If taking Doxycycline take with food but avoid calcium-rich foods as this can reduce the efficacy of Doxycycline. Side effects of doxycyline GI upset, esophagitis, and sun sensitivity.   Potential side effects of oral antibiotic N/V/D, rash, allergic reaction, pigment changes, and dizziness.     May take 2-3 months to see 50-70% improvement  May get worse during initial phase of treatment    Pathophysiology discussed with patient and information provided.  I discussed with patient oral versus topical treatments such as oral antibiotics, Spironolactone (Aldactone)(women only),oral contraceptive pills (women only) topical creams,  and over-the-counter treatments.     Acne can be effectively treated, although response may sometimes be slow.   Where possible, avoid excessively humid conditions such as a sauna, working in an unventilated kitchen or tropical vacations.   If you smoke, stop. Nicotine increases sebum retention and increased scale within the follicles, forming comedones (black and whiteheads).    Minimize the application of oils and cosmetics to the affected skin.  Abrasive skin treatments can aggravate acne.   Try not to scratch or pick the spots as this can increase your risk for permanent scarring in the area.   To avoid sunburn, protect your skin outdoors using a broad spectrum (UVB and UVA) sunscreen and protective clothing.  No relationship between particular foods and acne has been proven. However, reports suggest low glycemic and low dairy diet are helpful for some people.      Follow up in 2-3 months

## 2019-07-08 NOTE — LETTER
7/8/2019         RE: Anita Omalley  [address] [facility] [address]        Dear Colleague,    Thank you for referring your patient, Anita Omalley, to the [facility]. Please see a copy of my visit note below.    HPI:  Anita Omalley is a 24 year old female patient here today for follow up acne on face, chest, and back .  Patient states this has been present for a while.  Patient reports the following symptoms: great improvement on face and chest. Pt still breaking out on back .  Patient reports the following previous treatments: lov start bpo wash, minocycline oral, tretinoin 0.025%. No issues with minocycline.  Patient reports the following modifying factors: none.  Associated symptoms: none.  Patient has no other skin complaints today.  Remainder of the HPI, Meds, PMH, Allergies, FH, and SH was reviewed in chart.    Pertinent Hx:   Acne vulgaris  Past Medical History:   Diagnosis Date     Abnormal Pap smear of cervix 08/10/2018    see problem list     Anxiety disorder     No Comments Provided     Chronic rhinitis     No Comments Provided     Conjunctivitis     2004,Recurrent     Dysmenorrhea     No Comments Provided     Excessive and frequent menstruation with regular cycle     No Comments Provided       Past Surgical History:   Procedure Laterality Date     ADENOIDECTOMY      2012,Also tuminoplasty scheduled        Family History   Problem Relation Age of Onset     Skin Cancer No family hx of        Social History     Socioeconomic History     Marital status: Single     Spouse name: SO:  Michael Cordova     Number of children: Not on file     Years of education: 12+     Highest education level: Not on file   Occupational History     Employer: [employer]   Social Needs     Financial resource strain: Not on file     Food insecurity:     Worry: Not on file     Inability: Not on file     Transportation needs:     Medical: Not on file     Non-medical: Not on file   Tobacco Use      Smoking status: Passive Smoke Exposure - Never Smoker     Smokeless tobacco: Never Used   Substance and Sexual Activity     Alcohol use: No     Alcohol/week: 0.0 oz     Drug use: No     Comment: Drug use: No     Sexual activity: Yes     Partners: Male     Birth control/protection: Condom   Lifestyle     Physical activity:     Days per week: Not on file     Minutes per session: Not on file     Stress: Not on file   Relationships     Social connections:     Talks on phone: Not on file     Gets together: Not on file     Attends Restoration service: Not on file     Active member of club or organization: Not on file     Attends meetings of clubs or organizations: Not on file     Relationship status: Not on file     Intimate partner violence:     Fear of current or ex partner: Not on file     Emotionally abused: Not on file     Physically abused: Not on file     Forced sexual activity: Not on file   Other Topics Concern     Parent/sibling w/ CABG, MI or angioplasty before 65F 55M? Not Asked   Social History Narrative    Lives with mother and stepfather and four siblings.  Graduated [employer].  Regular contact with father.       Outpatient Encounter Medications as of 7/8/2019   Medication Sig Dispense Refill     benzoyl peroxide (BENZOYL PEROXIDE WASH) 10 % external liquid Wash area affected by acne on trunk and extremities once a day in the shower. 226 g 3     benzoyl peroxide (BENZOYL PEROXIDE WASH) 5 % external liquid Wash area affected on face by acne once a day in the shower. 226 g 3     clindamycin (CLINDAMAX) 1 % external gel Apply to area affected by acne in the am. 60 g 3     norethindrone (MICRONOR) 0.35 MG per tablet Take 1 tablet (0.35 mg) by mouth daily 28 tablet 12     spironolactone (ALDACTONE) 50 MG tablet Take 1 tablet (50 mg) by mouth daily 30 tablet 2     SUMAtriptan (IMITREX) 50 MG tablet Take 1 tablet by mouth every 2 hours as needed for migraine . Max dose: 200mg per 24 hours        tretinoin (RETIN-A) 0.025 % external cream Apply a pea-sized amount to each area affected by acne. Start every 3-4 nights working up to every night. 45 g 3     tretinoin (RETIN-A) 0.05 % external cream Apply a pea-sized amount to each area affected by acne. Start every 3-4 nights working up to every night. 45 g 3     [DISCONTINUED] minocycline (DYNACIN) 100 MG tablet Take one by mouth in the AM and one in the PM. 60 tablet 2     No facility-administered encounter medications on file as of 7/8/2019.        Review Of Systems:  Skin: As above  Eyes: negative  Ears/Nose/Throat: negative  Respiratory: No shortness of breath, dyspnea on exertion, cough, or hemoptysis  Cardiovascular: negative  Gastrointestinal: negative  Genitourinary: negative  Musculoskeletal: negative  Neurologic: negative  Psychiatric: negative  Hematologic/Lymphatic/Immunologic: negative  Endocrine: negative      Objective:     /67   Pulse 68   SpO2 99%   Eyes: Conjunctivae/lids: Normal   ENT: Lips:  Normal  MSK: Normal  Cardiovascular: Peripheral edema none  Pulm: Breathing Normal  Neuro/Psych: Orientation: Normal; Mood/Affect: Normal, NAD, WDWN  Pt accompanied by: self  Following areas examined: face, chest, neck, back  Donohue skin type:ii   Findings:  Multiple inflammatory papules and Light brown/pink smooth macules on back  Few Light brown/pink smooth macules on face  Assessment and Plan:  1) Acne vulgaris, PIH  Stop minocycline  Cr and k today    Morning regimen:    Wash with either a gentle cleanser such as Cetaphil gentle cleanser or Benzoyl peroxide wash 5%  Apply clindamycin  Apply a gentle moisturizer*      Evening regimen:    Wash with either a gentle cleanser such as Cetaphil gentle cleanser or Benzoyl peroxide wash 5%  Apply tretinoin 0.05%  Apply a gentle moisturizer (do not need sunscreen at night)  Take spironolactone    *Facial moisturizer (preferably with an SPF of 30 or greater) such as Cetaphil, CeraVe, or Vanicream.  Make sure lotion is non-comedogenic. Sunscreen active ingredients: Physical blockers are less likely to clog pores. Examples include titanium dioxide and zinc oxide  Good body moisturizers include Cetaphil, CeraVe, Eucerin, and Vanicream.    Disc Tretinoin at bedtime, dryness, irritation and way to prevent discussed with patient   Benzoyl Peroxide (BPO) wash daily or every other day depending on dryness  (2.5%-5% BPO on face and 5%-10% on chest and back)  Aggressive use of bland emollients discussed with patient   If taking Doxycycline take with food but avoid calcium-rich foods as this can reduce the efficacy of Doxycycline. Side effects of doxycyline GI upset, esophagitis, and sun sensitivity.   Potential side effects of oral antibiotic N/V/D, rash, allergic reaction, pigment changes, and dizziness.     May take 2-3 months to see 50-70% improvement  May get worse during initial phase of treatment    Pathophysiology discussed with patient and information provided.  I discussed with patient oral versus topical treatments such as oral antibiotics, Spironolactone (Aldactone)(women only),oral contraceptive pills (women only) topical creams,  and over-the-counter treatments.     Acne can be effectively treated, although response may sometimes be slow.   Where possible, avoid excessively humid conditions such as a sauna, working in an unventilated kitchen or tropical vacations.   If you smoke, stop. Nicotine increases sebum retention and increased scale within the follicles, forming comedones (black and whiteheads).    Minimize the application of oils and cosmetics to the affected skin.  Abrasive skin treatments can aggravate acne.   Try not to scratch or pick the spots as this can increase your risk for permanent scarring in the area.   To avoid sunburn, protect your skin outdoors using a broad spectrum (UVB and UVA) sunscreen and protective clothing.  No relationship between particular foods and acne has been proven.  However, reports suggest low glycemic and low dairy diet are helpful for some people.      Follow up in 2-3 months      Again, thank you for allowing me to participate in the care of your patient.        Sincerely,        Micheline Bang PA-C

## 2019-07-08 NOTE — PATIENT INSTRUCTIONS
Proper skin care from [facility]:    -Eliminate harsh soaps as they strip the natural oils from the skin, often resulting in dry itchy skin ( i.e. Dial, Zest, Candy Spring)  -Use mild soaps such as Cetaphil or Dove Sensitive Skin in the shower. You do not need to use soap on arms, legs, and trunk every time you shower unless visibly soiled.   -Avoid hot or cold showers.  -After showering, lightly dry off and apply moisturizing within 2-3 minutes. This will help trap moisture in the skin.   -Aggressive use of a moisturizer at least 1-2 times a day to the entire body (including -Vanicream, Cetaphil, Aquaphor or Cerave) and moisturize hands after every washing.  -We recommend using moisturizers that come in a tub that needs to be scooped out, not a pump. This has more of an oil base. It will hold moisture in your skin much better than a water base moisturizer. The above recommended are non-pore clogging.           Wear a sunscreen with at least SPF 30 on your face, ears, neck and V of the chest daily. Wear sunscreen on other areas of the body if those areas are exposed to the sun throughout the day. Sunscreens can contain physical and/or chemical blockers. Physical blockers are less likely to clog pores, these include zinc oxide and titanium dioxide. Reapply every two hour and after swimming. Sunscreen examples include Neutrogena, CeraVe, Blue Lizard, Elta MD and many others.    UV radiation  UVA radiation remains constant throughout the day and throughout the year. It is a longer wavelength than UVB and therefore penetrates deeper into the skin leading to immediate and delayed tanning, photoaging, and skin cancer. 70-80% of UVA and UVB radiation occurs between the hours of 10am-2pm.  UVB radiation  UVB radiation causes the most harmful effects and is more significant during the summer months. However, snow and ice can reflect UVB radiation leading to skin damage during the winter months as well. UVB  radiation is responsible for tanning, burning, inflammation, delayed erythema (pinkness), pigmentation (brown spots), and skin cancer.     Acne vulgaris, PIH  Stop minocycline    Morning regimen:    Wash with either a gentle cleanser such as Cetaphil gentle cleanser or Benzoyl peroxide wash 5%  Apply clindamycin  Apply a gentle moisturizer*      Evening regimen:    Wash with either a gentle cleanser such as Cetaphil gentle cleanser or Benzoyl peroxide wash 5%  Apply tretinoin 0.05%  Apply a gentle moisturizer (do not need sunscreen at night)  Take spironolactone    *Facial moisturizer (preferably with an SPF of 30 or greater) such as Cetaphil, CeraVe, or Vanicream. Make sure lotion is non-comedogenic. Sunscreen active ingredients: Physical blockers are less likely to clog pores. Examples include titanium dioxide and zinc oxide  Good body moisturizers include Cetaphil, CeraVe, Eucerin, and Vanicream.    Disc Tretinoin at bedtime, dryness, irritation and way to prevent discussed with patient   Benzoyl Peroxide (BPO) wash daily or every other day depending on dryness  (2.5%-5% BPO on face and 5%-10% on chest and back)  Aggressive use of bland emollients discussed with patient   If taking Doxycycline take with food but avoid calcium-rich foods as this can reduce the efficacy of Doxycycline. Side effects of doxycyline GI upset, esophagitis, and sun sensitivity.   Potential side effects of oral antibiotic N/V/D, rash, allergic reaction, pigment changes, and dizziness.     May take 2-3 months to see 50-70% improvement  May get worse during initial phase of treatment    Pathophysiology discussed with patient and information provided.  I discussed with patient oral versus topical treatments such as oral antibiotics, Spironolactone (Aldactone)(women only),oral contraceptive pills (women only) topical creams,  and over-the-counter treatments.     Acne can be effectively treated, although response may sometimes be slow.   Where  possible, avoid excessively humid conditions such as a sauna, working in an unventilated kitchen or tropical vacations.   If you smoke, stop. Nicotine increases sebum retention and increased scale within the follicles, forming comedones (black and whiteheads).    Minimize the application of oils and cosmetics to the affected skin.  Abrasive skin treatments can aggravate acne.   Try not to scratch or pick the spots as this can increase your risk for permanent scarring in the area.   To avoid sunburn, protect your skin outdoors using a broad spectrum (UVB and UVA) sunscreen and protective clothing.  No relationship between particular foods and acne has been proven. However, reports suggest low glycemic and low dairy diet are helpful for some people.    ACNE INFORMATION     Acne is a skin disease affecting oil glands and hair follicles in your skin.  When these pores do not drain properly, hair follicles can becomes clogged and bacteria can be trapped causing inflammation to occur. Clinical manifestations range from mild to severe, such as comedones (whiteheads and blackheads) or cysts.     Several factors contribute to acne:     1. Hormonal- Androgen (a type of hormone) can cause oil glands to enlarges and produces more sebum (oil).    2. Bacterial- A specific type of bacteria called Propionibacterium acnes are found in these oily follicles and stimulate more inflammation.     3. Genetics- History of family members (parents or siblings)     4. External factors- mechanical trauma, cosmetics, topical steroids or some oral medications.      Combination therapy is the mainstay of treatment given the multiple factors contributing to acne.  The type of treatment is also dependant on whether you are pregnancy or breastfeeding.     TOPICAL TREATMENTS   Topical Antibiotics These medications help prevent growth of bacteria in your pores.   Topical Exfoliating Agent These are drying agents that will help normalize oil  production, unplug pores and reduce bacterial growth. (Note: Make sure you discontinue this medication ONE week prior to waxing or your skin may lift.)     ORAL TREATMENTS   Oral Antibiotics: Tetracyclines are the most commonly used oral antibiotics to treat moderate to severe acne. They are safe to take for months at a time. Some side effects to these medications include: sun sensitivity, stomach upset, dizziness and heartburn. If you experience a sudden onset of rash or severe unusual headache, discontinue the medication and contact your clinician immediately.     Spironolactone: This oral medication blocks androgens (hormones that can aggravate acne).  Since this medication is also used as a diuretic, baseline blood work is needed to check your electrolytes and liver function. Do not get pregnant while on this medication as it can cause birth defects. Make sure to drink plenty of water.    Birth Control Pills (Females only). In order to decrease hormonal fluctuations that affect acne, birth control pills such as Raissa  or Anai  ( and others) can be effective in treating acne.  We advise you to discuss with your OB/GYN or Primary Care Doctor to be screened and to check if you are eligible to be on this pill.     Accutane  (generic: isotretinoin). This oral medication is a retinoid (Vitamin A derivative like Retin-A) used to treat severe acne that does not respond to the above medications. Accutane  can  help clear acne for years and the average period of treatment is five to seven months, however, the duration of treatment may be adjusted based on severity. Some people may need more than one treatment.     ALTERNATIVE TREATMENTS   Microdermabrasion & light chemical peels help improve skin texture, decrease pore size, decrease mild scarring & increase absorption of your topical treatments     Blue Spectrum Light Therapy consists of a concentrated visible light that kills bacteria in the oil ducts. It is safe for  pregnancy & nursing. At times, ALA (a topical solution) may be applied prior to the Blue Light exposure in order to enhance light penetration.     Pulse Dye Laser (PDL) decreases inflammation and improves certain acne scars.     Recommended facial cleansers, moisturizers & cosmetics:   Cetaphil  CeraVe  (Note: Look for  non-comeodogenic  cleansers & moisturizers)   Clinique , Prescriptives  make-up

## 2019-07-15 ENCOUNTER — TELEPHONE (OUTPATIENT)
Dept: DERMATOLOGY | Facility: CLINIC | Age: 24
End: 2019-07-15

## 2019-07-15 NOTE — TELEPHONE ENCOUNTER
This gel CLINDAMYCIN 1% GEL 60GM is currently on backorder, no anticipated stock date.     Pls fax alternative w/ stength, directions, qty and refills.    [facility] on file.

## 2019-07-15 NOTE — TELEPHONE ENCOUNTER
Called but unable to LM due to VM being full.    Giselle RN-BSN  [facility]  [phone]

## 2019-07-15 NOTE — LETTER
[facility]  [address] [address]  Phone: [phone]  Fax: [phone]    July 18, 2019      Anita Omalley                                                                                                                   [address] [facility] [address]            Dear Ms. Omalley,    We have been trying to get a hold of you in regards to a medication that was prescribed  during your last office visit in Dermatology.    Please give us a call at your earliest convenience.     Thank you,    [facility] / Griselda Madison RN-BSN

## 2019-07-16 NOTE — TELEPHONE ENCOUNTER
Called but unable to LM due to VM being full.    Giselle RN-BSN  [facility]  [phone]

## 2019-07-17 NOTE — TELEPHONE ENCOUNTER
Called but unable to LM due to VM being full.    Giselle RN-BSN  [facility]  [phone]

## 2019-07-18 NOTE — TELEPHONE ENCOUNTER
After 4 phone call attempts, snail mail letter sent:    Dear Ms. Omalley,    We have been trying to get a hold of you in regards to a medication that was prescribed during your last office visit in Dermatology.    Please give us a call at your earliest convenience.     Thank you,    [facility] / Griselda Madison RN-BSN

## 2019-07-18 NOTE — TELEPHONE ENCOUNTER
Called but unable to LM due to VM being full.    Giselle RN-BSN  [facility]  [phone]

## 2019-07-23 NOTE — TELEPHONE ENCOUNTER
Called and spoke to patient. Informed patient we were trying to get a hold of her in regards to her prescribed medication. Patient stated she has not picked up medication yet, but if the Clindamycin gel is still backordered she will call back and have us call in the lotion. Patient voiced understanding.    Giselle RN-BSN  [facility]  [phone]

## 2019-08-16 NOTE — PROGRESS NOTES
Anita is a 24 year old  female who presents for annual exam.     Besides routine health maintenance, she has no other health concerns today .    HPI:  The patient's PCP is  MISHEL SIMMONS MD.  Pt here today for her annual gyn exam and refill of her POP's    She ran out of POP's 2 weeks ago, unaware that she needed to be seen every year for refills.     Declines STD testing. Neg GC/CT last year, no new partners.     ASCUS pap last year. Will update today.       GYNECOLOGIC HISTORY:    Patient's last menstrual period was 2019 (exact date).  Her current contraception method is: oral contraceptives.  She  reports that she is a non-smoker but has been exposed to tobacco smoke. She has never used smokeless tobacco.    Patient is sexually active.  STD testing offered?  Declined  Last PHQ-9 score on record =   PHQ-9 SCORE 2019   PHQ-9 Total Score 0     Last GAD7 score on record =   ABY-7 SCORE 2019   Total Score 0     Alcohol Score = 4    HEALTH MAINTENANCE:  Cholesterol: (No results found for: CHOL)    TSH   Date Value Ref Range Status   2015 0.71 0.40 - 4.00 mU/L Final       Last Mammo: N/A  Pap:   Lab Results   Component Value Date    PAP ASC-US 08/10/2018     Colonoscopy:  N/A  Dexa:  N/A    Health maintenance updated:  no, Pap, Tdap, Gardasil due    HISTORY:  OB History    Para Term  AB Living   0 0 0 0 0 0   SAB TAB Ectopic Multiple Live Births   0 0 0 0 0       Patient Active Problem List   Diagnosis     Allergic state     Anemia     Anxiety state     Dysmenorrhea     Excessive or frequent menstruation     Migraine     Chronic rhinitis     Allergy     Menorrhagia     Migraine headache     Papanicolaou smear of cervix with atypical squamous cells of undetermined significance (ASC-US)     Past Surgical History:   Procedure Laterality Date     ADENOIDECTOMY      ,Also tuminoplasty scheduled      Social History     Tobacco Use     Smoking status: Passive  "Smoke Exposure - Never Smoker     Smokeless tobacco: Never Used   Substance Use Topics     Alcohol use: Yes     Alcohol/week: 0.0 oz         Negative family history of: Skin Cancer            Current Outpatient Medications   Medication Sig     benzoyl peroxide (BENZOYL PEROXIDE WASH) 10 % external liquid Wash area affected by acne on trunk and extremities once a day in the shower.     benzoyl peroxide (BENZOYL PEROXIDE WASH) 5 % external liquid Wash area affected on face by acne once a day in the shower.     clindamycin (CLINDAMAX) 1 % external gel Apply to area affected by acne in the am.     norethindrone (MICRONOR) 0.35 MG tablet Take 1 tablet (0.35 mg) by mouth daily     spironolactone (ALDACTONE) 50 MG tablet Take 1 tablet (50 mg) by mouth daily     SUMAtriptan (IMITREX) 50 MG tablet Take 1 tablet by mouth every 2 hours as needed for migraine . Max dose: 200mg per 24 hours     tretinoin (RETIN-A) 0.025 % external cream Apply a pea-sized amount to each area affected by acne. Start every 3-4 nights working up to every night.     tretinoin (RETIN-A) 0.05 % external cream Apply a pea-sized amount to each area affected by acne. Start every 3-4 nights working up to every night.     No current facility-administered medications for this visit.      Allergies   Allergen Reactions     Seasonal Allergies        Past medical, surgical, social and family histories were reviewed and updated in EPIC.    ROS:   12 point review of systems negative other than symptoms noted below.    EXAM:  /56   Pulse 80   Ht 1.689 m (5' 6.5\")   Wt 62.1 kg (136 lb 12.8 oz)   LMP 07/01/2019 (Exact Date)   BMI 21.75 kg/m     BMI: Body mass index is 21.75 kg/m .    PHYSICAL EXAM:  Constitutional:  Appearance: Well nourished, well developed, alert, in no acute distress  Neck:  Lymph Nodes:  No lymphadenopathy present    Thyroid:  Gland size normal, nontender, no nodules or masses present  on palpation  Chest:  Respiratory Effort:  " Breathing unlabored  Cardiovascular:    Heart: Auscultation:  Regular rate, normal rhythm, no murmurs present  Breasts: Inspection of Breasts:  No lymphadenopathy present., Palpation of Breasts and Axillae:  No masses present on palpation, no breast tenderness., Axillary Lymph Nodes:  No lymphadenopathy present. and No nodularity, asymmetry or nipple discharge bilaterally.  Gastrointestinal:   Abdominal Examination:  Abdomen nontender to palpation, tone normal without rigidity or guarding, no masses present, umbilicus without lesions   Liver and Spleen:  No hepatomegaly present, liver nontender to palpation    Hernias:  No hernias present  Lymphatic: Lymph Nodes:  No other lymphadenopathy present  Skin:  General Inspection:  No rashes present, no lesions present, no areas of  discoloration    Genitalia and Groin:  No rashes present, no lesions present, no areas of  discoloration, no masses present  Neurologic/Psychiatric:    Mental Status:  Oriented X3     Pelvic Exam:  External Genitalia:     Normal appearance for age, no discharge present, no tenderness present, no inflammatory lesions present, color normal  Vagina:     Normal vaginal vault without central or paravaginal defects, no discharge present, no inflammatory lesions present, no masses present  Bladder:     Nontender to palpation  Urethra:   Urethral Body:  Urethra palpation normal, urethra structural support normal   Urethral Meatus:  No erythema or lesions present  Cervix:     Appearance healthy, no lesions present, nontender to palpation, no bleeding present  Uterus:     Uterus: firm, normal sized and nontender, midplane in position.   Adnexa:     No adnexal tenderness present, no adnexal masses present  Perineum:     Perineum within normal limits, no evidence of trauma, no rashes or skin lesions present  Anus:     Anus within normal limits, no hemorrhoids present  Inguinal Lymph Nodes:     No lymphadenopathy present  Pubic Hair:     Normal pubic hair  distribution for age  Genitalia and Groin:     No rashes present, no lesions present, no areas of discoloration, no masses present      COUNSELING:   Special attention given to:        Regular exercise       Healthy diet/nutrition       Contraception       Safe sex practices/STD prevention    BMI: Body mass index is 21.75 kg/m .      ASSESSMENT:  24 year old female with satisfactory annual exam.    ICD-10-CM    1. Encounter for gynecological examination without abnormal finding Z01.419 Pap imaged thin layer screen only - recommended age 21 - 24 years   2. Encounter for initial prescription of contraceptive pills Z30.011 norethindrone (MICRONOR) 0.35 MG tablet       PLAN:  Pap updated today. If NIL I would like 2 NIL in a row before spacing out screening. Ok to restart POP's. BUM for 1 month.     SHWETA Yost CNP

## 2019-08-21 ENCOUNTER — OFFICE VISIT (OUTPATIENT)
Dept: OBGYN | Facility: CLINIC | Age: 24
End: 2019-08-21
Payer: COMMERCIAL

## 2019-08-21 VITALS
BODY MASS INDEX: 21.47 KG/M2 | DIASTOLIC BLOOD PRESSURE: 56 MMHG | WEIGHT: 136.8 LBS | HEIGHT: 67 IN | HEART RATE: 80 BPM | SYSTOLIC BLOOD PRESSURE: 118 MMHG

## 2019-08-21 DIAGNOSIS — Z01.419 ENCOUNTER FOR GYNECOLOGICAL EXAMINATION WITHOUT ABNORMAL FINDING: Primary | ICD-10-CM

## 2019-08-21 DIAGNOSIS — Z30.011 ENCOUNTER FOR INITIAL PRESCRIPTION OF CONTRACEPTIVE PILLS: ICD-10-CM

## 2019-08-21 PROCEDURE — 88175 CYTOPATH C/V AUTO FLUID REDO: CPT | Performed by: NURSE PRACTITIONER

## 2019-08-21 PROCEDURE — 99395 PREV VISIT EST AGE 18-39: CPT | Performed by: NURSE PRACTITIONER

## 2019-08-21 RX ORDER — ACETAMINOPHEN AND CODEINE PHOSPHATE 120; 12 MG/5ML; MG/5ML
0.35 SOLUTION ORAL DAILY
Qty: 84 TABLET | Refills: 4 | Status: SHIPPED | OUTPATIENT
Start: 2019-08-21 | End: 2020-07-14

## 2019-08-21 ASSESSMENT — ANXIETY QUESTIONNAIRES
7. FEELING AFRAID AS IF SOMETHING AWFUL MIGHT HAPPEN: NOT AT ALL
1. FEELING NERVOUS, ANXIOUS, OR ON EDGE: NOT AT ALL
GAD7 TOTAL SCORE: 0
2. NOT BEING ABLE TO STOP OR CONTROL WORRYING: NOT AT ALL
5. BEING SO RESTLESS THAT IT IS HARD TO SIT STILL: NOT AT ALL
6. BECOMING EASILY ANNOYED OR IRRITABLE: NOT AT ALL
3. WORRYING TOO MUCH ABOUT DIFFERENT THINGS: NOT AT ALL

## 2019-08-21 ASSESSMENT — MIFFLIN-ST. JEOR: SCORE: 1405.21

## 2019-08-21 ASSESSMENT — PATIENT HEALTH QUESTIONNAIRE - PHQ9
5. POOR APPETITE OR OVEREATING: NOT AT ALL
SUM OF ALL RESPONSES TO PHQ QUESTIONS 1-9: 0

## 2019-08-22 ASSESSMENT — ANXIETY QUESTIONNAIRES: GAD7 TOTAL SCORE: 0

## 2019-08-26 LAB
COPATH REPORT: NORMAL
PAP: NORMAL

## 2020-03-02 ENCOUNTER — HEALTH MAINTENANCE LETTER (OUTPATIENT)
Age: 25
End: 2020-03-02

## 2020-07-14 ENCOUNTER — MYC REFILL (OUTPATIENT)
Dept: OBGYN | Facility: CLINIC | Age: 25
End: 2020-07-14

## 2020-07-14 DIAGNOSIS — Z30.011 ENCOUNTER FOR INITIAL PRESCRIPTION OF CONTRACEPTIVE PILLS: ICD-10-CM

## 2020-07-14 RX ORDER — ACETAMINOPHEN AND CODEINE PHOSPHATE 120; 12 MG/5ML; MG/5ML
0.35 SOLUTION ORAL DAILY
Qty: 84 TABLET | Refills: 0 | Status: SHIPPED | OUTPATIENT
Start: 2020-07-14 | End: 2020-10-06

## 2020-07-14 NOTE — TELEPHONE ENCOUNTER
"Requested Prescriptions   Pending Prescriptions Disp Refills     norethindrone (MICRONOR) 0.35 MG tablet 84 tablet 4     Sig: Take 1 tablet (0.35 mg) by mouth daily       Contraceptives Protocol Passed - 7/14/2020  3:02 PM        Passed - Patient is not a current smoker if age is 35 or older        Passed - Recent (12 mo) or future (30 days) visit within the authorizing provider's specialty     Patient has had an office visit with the authorizing provider or a provider within the authorizing providers department within the previous 12 mos or has a future within next 30 days. See \"Patient Info\" tab in inbasket, or \"Choose Columns\" in Meds & Orders section of the refill encounter.              Passed - Medication is active on med list        Passed - No active pregnancy on record        Passed - No positive pregnancy test in past 12 months           Last Written Prescription Date:  8/21/19  Last Fill Quantity: 84,  # refills: 4   Last office visit: 8/21/2019 with prescribing provider:  DANIEL Lux NP   Future Office Visit:      New location for pharmacy.  Sent refill x1 with reminder due for annual in August.  Patsy Norwood RN on 7/14/2020 at 3:47 PM        "

## 2020-07-17 ENCOUNTER — OFFICE VISIT (OUTPATIENT)
Dept: FAMILY MEDICINE | Facility: OTHER | Age: 25
End: 2020-07-17
Attending: NURSE PRACTITIONER
Payer: COMMERCIAL

## 2020-07-17 ENCOUNTER — HOSPITAL ENCOUNTER (OUTPATIENT)
Dept: GENERAL RADIOLOGY | Facility: OTHER | Age: 25
End: 2020-07-17
Attending: FAMILY MEDICINE
Payer: COMMERCIAL

## 2020-07-17 VITALS — RESPIRATION RATE: 16 BRPM | SYSTOLIC BLOOD PRESSURE: 120 MMHG | HEART RATE: 108 BPM | DIASTOLIC BLOOD PRESSURE: 76 MMHG

## 2020-07-17 DIAGNOSIS — S93.402A SPRAIN OF LEFT ANKLE, UNSPECIFIED LIGAMENT, INITIAL ENCOUNTER: Primary | ICD-10-CM

## 2020-07-17 DIAGNOSIS — S99.912A ANKLE INJURY, LEFT, INITIAL ENCOUNTER: ICD-10-CM

## 2020-07-17 PROCEDURE — 73610 X-RAY EXAM OF ANKLE: CPT | Mod: LT

## 2020-07-17 PROCEDURE — 99213 OFFICE O/P EST LOW 20 MIN: CPT | Performed by: FAMILY MEDICINE

## 2020-07-17 ASSESSMENT — PAIN SCALES - GENERAL: PAINLEVEL: EXTREME PAIN (8)

## 2020-07-17 NOTE — NURSING NOTE
"Chief Complaint   Patient presents with     Musculoskeletal Problem     Pt present to clinic today for a left ankle injury that happened yesterday.  Initial /76 (BP Location: Right arm, Patient Position: Sitting, Cuff Size: Adult Regular)   Pulse 108   Resp 16   LMP 07/04/2020  Estimated body mass index is 21.75 kg/m  as calculated from the following:    Height as of 8/21/19: 1.689 m (5' 6.5\").    Weight as of 8/21/19: 62.1 kg (136 lb 12.8 oz).  Medication Reconciliation: complete    Consuelo Alexis LPN  "

## 2020-07-17 NOTE — PATIENT INSTRUCTIONS
Patient Education     Treating Ankle Sprains  Treatment will depend on how bad your sprain is. For a severe sprain, healing may take 3 months or more.  Right after your injury: Use R.I.C.E.    BIG: Rest: At first, keep weight off the ankle as much as you can. You may be given crutches to help you walk without putting weight on the ankle.    BIG: Ice: Put an ice pack on the ankle for 20 minutes. Remove the pack and wait at least 30 minutes. Repeat for up to 3 days. This helps reduce swelling.    BIG: Compression: To reduce swelling and keep the joint stable, you may need to wrap the ankle with an elastic bandage. For more severe sprains, you may need an ankle brace, a boot, or a cast.    BIG: Elevation: To reduce swelling, keep your ankle raised above your heart when you sit or lie down.  Medicine  Your healthcare provider may suggest oral nonsteroidal anti-inflammatory medicine (NSAIDs), such as ibuprofen. This relieves the pain and helps reduce swelling. Be sure to take your medicine as directed.  Exercises    After about 2 to 3 weeks, you may be given exercises to strengthen the ligaments and muscles in the ankle. Doing these exercises will help prevent another ankle sprain. Exercises may include standing on your toes and then on your heels and doing ankle curls.    Sit on the edge of a sturdy table or lie on your back.    Pull your toes toward you. Then point them away from you. Repeat for 2 to 3 minutes.  Date Last Reviewed: 1/1/2018 2000-2019 [employer]. [address] [place] [address], [place] [address]. All rights reserved. This information is not intended as a substitute for professional medical care. Always follow your healthcare professional's instructions.

## 2020-07-17 NOTE — PROGRESS NOTES
"Nursing Notes:   Consuelo Alexis LPN  7/17/2020 11:39 AM  Signed  Chief Complaint   Patient presents with     Musculoskeletal Problem     Pt present to clinic today for a left ankle injury that happened yesterday.  Initial /76 (BP Location: Right arm, Patient Position: Sitting, Cuff Size: Adult Regular)   Pulse 108   Resp 16   LMP 07/04/2020  Estimated body mass index is 21.75 kg/m  as calculated from the following:    Height as of 8/21/19: 1.689 m (5' 6.5\").    Weight as of 8/21/19: 62.1 kg (136 lb 12.8 oz).  Medication Reconciliation: complete    Consuelo LIEBERMAN. ASHLEY Alexis    SUBJECTIVE:  Anita Omalley is a 25 year old female here with mother who complains of inversion injury to the left ankle last evening  Immediate symptoms: immediate pain, immediate swelling. Symptoms have been unchanged since that time. Prior history of related problems: no prior problems with this area in the past. There is pain and swelling at the lateral aspect of that ankle.     OBJECTIVE:  Vital signs:      BP: 120/76 Pulse: 108   Resp: 16         Weight: (unable)  Estimated body mass index is 21.75 kg/m  as calculated from the following:    Height as of 8/21/19: 1.689 m (5' 6.5\").    Weight as of 8/21/19: 62.1 kg (136 lb 12.8 oz).     There is swelling and tenderness over the lateral malleolus. No tenderness over the medial aspect of the ankle. The fifth metatarsal is not tender. The ankle joint is intact without excessive opening on stressing.The rest of the foot, ankle and leg exam is normal.  Results for orders placed or performed in visit on 07/17/20   XR Ankle Left G/E 3 Views     Status: None    Narrative    XR ANKLE LT G/E 3 VW    HISTORY: 25 years Female Ankle injury, left, initial encounter    COMPARISON: None    TECHNIQUE: 3 views left ankle    FINDINGS: There is lateral soft tissue edema. Ankle mortise is  congruent. There is no evidence of fracture or dislocation.      Impression    IMPRESSION: Lateral soft tissue " edema without evidence of fracture or  dislocation.    LEXIS BEAR MD     I have personally reviewed the xrays listed above.    ASSESSMENT:  1. Sprain of left ankle, unspecified ligament, initial encounter    2. Ankle injury, left, initial encounter            PLAN:  NSAID, ice suggested  Activity modification, Stretches  Use crutches prn and ACE wrap prn.  FOLLOW UP if not improving.      Hui Bhakta MD  11:52 AM 7/17/2020

## 2020-09-11 ENCOUNTER — E-VISIT (OUTPATIENT)
Dept: FAMILY MEDICINE | Facility: OTHER | Age: 25
End: 2020-09-11
Payer: COMMERCIAL

## 2020-09-11 DIAGNOSIS — N39.0 ACUTE UTI (URINARY TRACT INFECTION): Primary | ICD-10-CM

## 2020-09-11 PROCEDURE — 99421 OL DIG E/M SVC 5-10 MIN: CPT | Performed by: FAMILY MEDICINE

## 2020-09-13 RX ORDER — SULFAMETHOXAZOLE/TRIMETHOPRIM 800-160 MG
1 TABLET ORAL 2 TIMES DAILY
Qty: 6 TABLET | Refills: 0 | Status: SHIPPED | OUTPATIENT
Start: 2020-09-13 | End: 2020-09-16

## 2020-09-13 NOTE — PATIENT INSTRUCTIONS
Thank you for choosing us for your care. I have placed an order for a prescription so that you can start treatment. View your full visit summary for details by clicking on the link below. Your pharmacist will able to address any questions you may have about the medication.     If you re not feeling better within 2-3 days, please schedule an appointment.  You can schedule an appointment right here in [place], or call [phone]  If the visit is for the same symptoms as your e-visit, we ll refund the cost of your e-visit if seen within seven days.      Urinary Tract Infections in Women    Urinary tract infections (UTIs) are most often caused by bacteria. These bacteria enter the urinary tract. The bacteria may come from outside the body. Or they may travel from the skin outside the rectum or vagina into the urethra. Female anatomy makes it easy for bacteria from the bowel to enter a woman s urinary tract, which is the most common source of UTI. This means women develop UTIs more often than men. Pain in or around the urinary tract is a common UTI symptom. But the only way to know for sure if you have a UTI for the healthcare provider to test your urine. The two tests that may be done are the urinalysis and urine culture.  Types of UTIs    Cystitis. A bladder infection (cystitis) is the most common UTI in women. You may have urgent or frequent urination. You may also have pain, burning when you urinate, and bloody urine.    Urethritis. This is an inflamed urethra, which is the tube that carries urine from the bladder to outside the body. You may have lower stomach or back pain. You may also have urgent or frequent urination.    Pyelonephritis. This is a kidney infection. If not treated, it can be serious and damage your kidneys. In severe cases, you may need to stay in the hospital. You may have a fever and lower back pain.  Medicines to treat a UTI  Most UTIs are treated with antibiotics. These kill the bacteria.  The length of time you need to take them depends on the type of infection. It may be as short as 3 days. If you have repeated UTIs, you may need a low-dose antibiotic for several months. Take antibiotics exactly as directed. Don t stop taking them until all of the medicine is gone. If you stop taking the antibiotic too soon, the infection may not go away. You may also develop a resistance to the antibiotic. This can make it much harder to treat.  Lifestyle changes to treat and prevent UTIs  The lifestyle changes below will help get rid of your UTI. They may also help prevent future UTIs.    Drink plenty of fluids. This includes water, juice, or other caffeine-free drinks. Fluids help flush bacteria out of your body.    Empty your bladder. Always empty your bladder when you feel the urge to urinate. And always urinate before going to sleep. Urine that stays in your bladder can lead to infection. Try to urinate before and after sex as well.    Practice good personal hygiene. Wipe yourself from front to back after using the toilet. This helps keep bacteria from getting into the urethra.    Use condoms during sex. These help prevent UTIs caused by sexually transmitted bacteria. Also don't use spermicides during sex. These can increase the risk for UTIs. Choose other forms of birth control instead. For women who tend to get UTIs after sex, a low-dose of a preventive antibiotic may be used. Be sure to discuss this option with your healthcare provider.    Follow up with your healthcare provider as directed. He or she may test to make sure the infection has cleared. If needed, more treatment may be started.  Date Last Reviewed: 1/1/2017 2000-2019 [employer]. [address] [place] [address], [place] [address]. All rights reserved. This information is not intended as a substitute for professional medical care. Always follow your healthcare professional's instructions.

## 2020-10-06 ENCOUNTER — OFFICE VISIT (OUTPATIENT)
Dept: FAMILY MEDICINE | Facility: CLINIC | Age: 25
End: 2020-10-06
Payer: COMMERCIAL

## 2020-10-06 VITALS
TEMPERATURE: 98.6 F | OXYGEN SATURATION: 98 % | HEIGHT: 66 IN | WEIGHT: 153 LBS | SYSTOLIC BLOOD PRESSURE: 122 MMHG | HEART RATE: 105 BPM | DIASTOLIC BLOOD PRESSURE: 81 MMHG | BODY MASS INDEX: 24.59 KG/M2

## 2020-10-06 DIAGNOSIS — Z30.8 ENCOUNTER FOR OTHER CONTRACEPTIVE MANAGEMENT: Primary | ICD-10-CM

## 2020-10-06 DIAGNOSIS — R87.610 PAPANICOLAOU SMEAR OF CERVIX WITH ATYPICAL SQUAMOUS CELLS OF UNDETERMINED SIGNIFICANCE (ASC-US): ICD-10-CM

## 2020-10-06 PROCEDURE — G0145 SCR C/V CYTO,THINLAYER,RESCR: HCPCS | Performed by: NURSE PRACTITIONER

## 2020-10-06 PROCEDURE — 87491 CHLMYD TRACH DNA AMP PROBE: CPT | Performed by: NURSE PRACTITIONER

## 2020-10-06 PROCEDURE — 99213 OFFICE O/P EST LOW 20 MIN: CPT | Performed by: NURSE PRACTITIONER

## 2020-10-06 PROCEDURE — 87591 N.GONORRHOEAE DNA AMP PROB: CPT | Performed by: NURSE PRACTITIONER

## 2020-10-06 RX ORDER — ACETAMINOPHEN AND CODEINE PHOSPHATE 120; 12 MG/5ML; MG/5ML
0.35 SOLUTION ORAL DAILY
Qty: 84 TABLET | Refills: 3 | Status: SHIPPED | OUTPATIENT
Start: 2020-10-06 | End: 2021-09-30

## 2020-10-06 ASSESSMENT — MIFFLIN-ST. JEOR: SCORE: 1465.75

## 2020-10-06 NOTE — LETTER
October 7, 2020      Anitajanel Omalley  [address]   [place] [place] [address]        Dear ,    We are writing to inform you of your test results.    The chlamydia and gonorrhea routine screening results are negative as expected.    Resulted Orders   NEISSERIA GONORRHOEA PCR   Result Value Ref Range    Specimen Descrip Urine     N Gonorrhea PCR Negative NEG^Negative      Comment:      Negative for N. gonorrhoeae rRNA by transcription mediated amplification.  A negative result by transcription mediated amplification does not preclude   the presence of N. gonorrhoeae infection because results are dependent on   proper and adequate collection, absence of inhibitors, and sufficient rRNA to   be detected.     CHLAMYDIA TRACHOMATIS PCR   Result Value Ref Range    Specimen Description Urine     Chlamydia Trachomatis PCR Negative NEG^Negative      Comment:      Negative for C. trachomatis rRNA by transcription mediated amplification.  A negative result by transcription mediated amplification does not preclude   the presence of C. trachomatis infection because results are dependent on   proper and adequate collection, absence of inhibitors, and sufficient rRNA to   be detected.         If you have any questions or concerns, please call the clinic at the number listed above.       Sincerely,        SHWETA Stock CNP    [facility]

## 2020-10-06 NOTE — PATIENT INSTRUCTIONS
Check with insurance carrier for payment of HPV vaccine series (3)  Call the clinic to set up in the nurse's schedule- the series is completed over 6 months

## 2020-10-06 NOTE — PROGRESS NOTES
"Subjective     Anita Omalley is a 25 year old female who presents to clinic today for the following health issues:    HPI         Chief Complaint   Patient presents with     Medication Request     Pap Smear to renew birth control prescription      ASCUS in 8/2018;  Normal pap 8/2019  Anita is sexually active  Has had one partner for 2 years  Denies any problems with her current OCP    Review of Systems   Constitutional, HEENT, cardiovascular, pulmonary, gi and gu systems are negative, except as otherwise noted.      Objective    /81 (BP Location: Right arm, Patient Position: Sitting, Cuff Size: Adult Regular)   Pulse 105   Temp 98.6  F (37  C) (Temporal)   Ht 1.676 m (5' 6\")   Wt 69.4 kg (153 lb)   LMP 09/28/2020   SpO2 98%   Breastfeeding No   BMI 24.69 kg/m    Body mass index is 24.69 kg/m .  Physical Exam   GENERAL: healthy, alert and no distress  NECK: no adenopathy, no asymmetry, masses, or scars and thyroid normal to palpation  RESP: lungs clear to auscultation - no rales, rhonchi or wheezes  CV: regular rate and rhythm, normal S1 S2, no S3 or S4, no murmur, click or rub, no peripheral edema and peripheral pulses strong  ABDOMEN: soft, nontender, no hepatosplenomegaly, no masses and bowel sounds normal   (female): normal female external genitalia, normal urethral meatus, vaginal mucosa, normal cervix/adnexa/uterus without masses or discharge  MS: no gross musculoskeletal defects noted, no edema  Results for orders placed or performed in visit on 10/06/20   NEISSERIA GONORRHOEA PCR     Status: None    Specimen: Urine   Result Value Ref Range    Specimen Descrip Urine     N Gonorrhea PCR Negative NEG^Negative   CHLAMYDIA TRACHOMATIS PCR     Status: None    Specimen: Urine   Result Value Ref Range    Specimen Description Urine     Chlamydia Trachomatis PCR Negative NEG^Negative           Assessment & Plan     Encounter for other contraceptive management  She is given micronor refill #84 3 " refills      - NEISSERIA GONORRHOEA PCR  - CHLAMYDIA TRACHOMATIS PCR    Papanicolaou smear of cervix with atypical squamous cells of undetermined significance (ASC-US)    - Pap imaged thin layer screen reflex to HPV if ASCUS - recommend age 25 - 29  If negative/normal she will be due for repeat pap in 3 years    SHWETA Stock CNP  [facility]

## 2020-10-09 LAB
COPATH REPORT: NORMAL
PAP: NORMAL

## 2021-09-16 DIAGNOSIS — Z30.8 ENCOUNTER FOR OTHER CONTRACEPTIVE MANAGEMENT: Primary | ICD-10-CM

## 2021-09-17 NOTE — TELEPHONE ENCOUNTER
Norethisterone 0.35 mg tablets    Summary: Take 1 tablet (0.35 mg) by mouth daily Due for annual exam for further refills., Disp-84 tablet, R-3, E-Prescribe     Dose, Route, Frequency: 0.35 mg, Oral, DAILY    Start: 10/6/2020    Ord/Sold: 10/6/2020

## 2021-09-17 NOTE — TELEPHONE ENCOUNTER
TC: Can you please call patient to schedule and establish care. Please route back to triage once scheduled to follow up with DOD.    Eric Izaguirre RN  [facility]

## 2021-09-30 RX ORDER — ACETAMINOPHEN AND CODEINE PHOSPHATE 120; 12 MG/5ML; MG/5ML
0.35 SOLUTION ORAL DAILY
Qty: 84 TABLET | Refills: 0 | Status: SHIPPED | OUTPATIENT
Start: 2021-09-30

## 2021-09-30 NOTE — TELEPHONE ENCOUNTER
Routing refill request to provider for review/approval because:  This is first rocky refill needed.  Routing to DOD for 1x refill of birth control to give pt time to make appt.  Eleanor Montano, RN  [facility] RN Triage Team